# Patient Record
Sex: FEMALE | Race: WHITE | Employment: FULL TIME | URBAN - NONMETROPOLITAN AREA
[De-identification: names, ages, dates, MRNs, and addresses within clinical notes are randomized per-mention and may not be internally consistent; named-entity substitution may affect disease eponyms.]

---

## 2017-12-29 ENCOUNTER — APPOINTMENT (OUTPATIENT)
Dept: CT IMAGING | Age: 22
End: 2017-12-29
Payer: COMMERCIAL

## 2017-12-29 ENCOUNTER — APPOINTMENT (OUTPATIENT)
Dept: GENERAL RADIOLOGY | Age: 22
End: 2017-12-29
Payer: COMMERCIAL

## 2017-12-29 ENCOUNTER — HOSPITAL ENCOUNTER (EMERGENCY)
Age: 22
Discharge: HOME | End: 2017-12-29
Attending: FAMILY MEDICINE
Payer: COMMERCIAL

## 2017-12-29 VITALS
TEMPERATURE: 98.1 F | HEART RATE: 73 BPM | WEIGHT: 230 LBS | RESPIRATION RATE: 14 BRPM | BODY MASS INDEX: 32.93 KG/M2 | DIASTOLIC BLOOD PRESSURE: 79 MMHG | HEIGHT: 70 IN | SYSTOLIC BLOOD PRESSURE: 109 MMHG | OXYGEN SATURATION: 98 %

## 2017-12-29 DIAGNOSIS — S80.01XA CONTUSION OF RIGHT KNEE, INITIAL ENCOUNTER: ICD-10-CM

## 2017-12-29 DIAGNOSIS — S16.1XXA CERVICAL STRAIN, ACUTE, INITIAL ENCOUNTER: Primary | ICD-10-CM

## 2017-12-29 PROCEDURE — 99283 EMERGENCY DEPT VISIT LOW MDM: CPT

## 2017-12-29 PROCEDURE — 72125 CT NECK SPINE W/O DYE: CPT

## 2017-12-29 PROCEDURE — 70450 CT HEAD/BRAIN W/O DYE: CPT

## 2017-12-29 PROCEDURE — 73564 X-RAY EXAM KNEE 4 OR MORE: CPT

## 2017-12-29 RX ORDER — METAXALONE 800 MG/1
800 TABLET ORAL 3 TIMES DAILY PRN
Qty: 30 TABLET | Refills: 0 | Status: SHIPPED | OUTPATIENT
Start: 2017-12-29 | End: 2018-01-08

## 2017-12-29 RX ORDER — NAPROXEN 500 MG/1
500 TABLET ORAL 2 TIMES DAILY PRN
Qty: 30 TABLET | Refills: 0 | Status: SHIPPED | OUTPATIENT
Start: 2017-12-29 | End: 2022-06-29

## 2017-12-29 ASSESSMENT — PAIN DESCRIPTION - FREQUENCY: FREQUENCY: CONTINUOUS

## 2017-12-29 ASSESSMENT — PAIN SCALES - GENERAL: PAINLEVEL_OUTOF10: 5

## 2017-12-29 ASSESSMENT — PAIN DESCRIPTION - ORIENTATION: ORIENTATION: RIGHT

## 2017-12-29 ASSESSMENT — PAIN DESCRIPTION - PAIN TYPE: TYPE: ACUTE PAIN

## 2017-12-29 ASSESSMENT — PAIN DESCRIPTION - DESCRIPTORS: DESCRIPTORS: ACHING

## 2017-12-29 ASSESSMENT — PAIN DESCRIPTION - LOCATION: LOCATION: KNEE
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
## 2017-12-29 NOTE — ED NOTES
Patient decides on Xrays. To Xray and back per wheel chair.      Jose Cruz Stevens, SHAYNAN  40/78/76 1244

## 2017-12-29 NOTE — ED NOTES
Dr. Wagner Govern to speak with patient regarding Xray results.      Mag Treadwell LPN  46/90/99 5401

## 2017-12-29 NOTE — ED NOTES
Patient undecided on whether she wants Xrays or not waiting to talk to Father.      Jermaine Manzanares, SHAYNAN  46/25/86 4800
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
## 2018-03-05 ENCOUNTER — HOSPITAL ENCOUNTER (EMERGENCY)
Age: 23
Discharge: HOME | End: 2018-03-05
Payer: COMMERCIAL

## 2018-03-05 VITALS
BODY MASS INDEX: 34.96 KG/M2 | WEIGHT: 236 LBS | HEART RATE: 89 BPM | HEIGHT: 69 IN | SYSTOLIC BLOOD PRESSURE: 126 MMHG | RESPIRATION RATE: 16 BRPM | OXYGEN SATURATION: 98 % | DIASTOLIC BLOOD PRESSURE: 80 MMHG | TEMPERATURE: 97.2 F

## 2018-03-05 DIAGNOSIS — J04.0 LARYNGITIS, ACUTE: ICD-10-CM

## 2018-03-05 DIAGNOSIS — J02.9 VIRAL PHARYNGITIS: Primary | ICD-10-CM

## 2018-03-05 PROCEDURE — 99212 OFFICE O/P EST SF 10 MIN: CPT

## 2018-03-05 PROCEDURE — 99202 OFFICE O/P NEW SF 15 MIN: CPT | Performed by: NURSE PRACTITIONER

## 2018-03-05 RX ORDER — METHYLPREDNISOLONE 4 MG/1
TABLET ORAL
Qty: 1 KIT | Refills: 0 | Status: SHIPPED | OUTPATIENT
Start: 2018-03-05 | End: 2018-03-11

## 2018-03-05 ASSESSMENT — ENCOUNTER SYMPTOMS
VOICE CHANGE: 1
SINUS CONGESTION: 0
SINUS PRESSURE: 0
ABDOMINAL PAIN: 0
RHINORRHEA: 0
DIARRHEA: 0
VOMITING: 0
SORE THROAT: 1
NAUSEA: 0
SHORTNESS OF BREATH: 0
TROUBLE SWALLOWING: 0
SINUS PAIN: 0
BACK PAIN: 0
CHEST TIGHTNESS: 0
COUGH: 1

## 2018-03-06 NOTE — ED PROVIDER NOTES
[facility]  Urgent Care Encounter       CHIEF COMPLAINT       Chief Complaint   Patient presents with    Cough    Pharyngitis       Nurses Notes reviewed and I agree except as noted in the HPI. HISTORY OF PRESENT ILLNESS   Josef Lopez is a 23 y.o. female who presents To the urgent care center complaining of a sore throat and cough. 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 The patient does have a raspy voice. The patient denied any fever or chills. The patient states she has been singing about 4 hours a day and may have some laryngitis from overusage of the voice. The history is provided by the patient. No  was used. Pharyngitis   Location:  Generalized  Quality:  Sore  Severity:  Mild  Onset quality:  Sudden  Duration:  2 days  Timing:  Constant  Progression:  Unchanged  Chronicity:  New  Relieved by:  Nothing  Worsened by:  Nothing  Ineffective treatments:  None tried  Associated symptoms: cough and voice change    Associated symptoms: no abdominal pain, no adenopathy, no chest pain, no chills, no ear discharge, no ear pain, no epistaxis, no fever, no headaches, no neck stiffness, no postnasal drip, no rash, no rhinorrhea, no shortness of breath, no sinus congestion and no trouble swallowing    Cough:     Cough characteristics:  Non-productive, harsh, barking and dry    Severity:  Mild    Onset quality:  Sudden    Duration:  1 day    Timing:  Intermittent    Progression:  Waxing and waning    Chronicity:  New  Risk factors: sick contacts    Risk factors: no exposure to strep    Risk factors comment:  Flu A      REVIEW OF SYSTEMS     Review of Systems   Constitutional: Negative for activity change, appetite change, chills, diaphoresis, fatigue and fever. HENT: Positive for sore throat and voice change.  Negative for congestion, ear discharge, ear pain, appear ill. No distress. HENT:   Head: Normocephalic. Right Ear: Hearing, tympanic membrane, external ear and ear canal normal. No drainage, swelling or tenderness. No mastoid tenderness. Tympanic membrane is not erythematous. Left Ear: Hearing, tympanic membrane, external ear and ear canal normal. No drainage, swelling or tenderness. No mastoid tenderness. Tympanic membrane is not erythematous. Nose: Nose normal. No mucosal edema or rhinorrhea. Right sinus exhibits no maxillary sinus tenderness and no frontal sinus tenderness. Left sinus exhibits no maxillary sinus tenderness and no frontal sinus tenderness. Mouth/Throat: Uvula is midline, oropharynx is clear and moist and mucous membranes are normal. No oropharyngeal exudate, posterior oropharyngeal edema or posterior oropharyngeal erythema. Neck: Normal range of motion and full passive range of motion without pain. Neck supple. No muscular tenderness present. Cardiovascular: Normal rate, regular rhythm, S1 normal, S2 normal and normal heart sounds. Pulmonary/Chest: Effort normal and breath sounds normal. No accessory muscle usage. No respiratory distress. She has no decreased breath sounds. She has no wheezes. She has no rhonchi. She has no rales. She exhibits no tenderness. Abdominal: Normal appearance. Lymphadenopathy:        Head (right side): No submental, no submandibular, no tonsillar, no preauricular, no posterior auricular and no occipital adenopathy present. Head (left side): No submental, no submandibular, no tonsillar, no preauricular, no posterior auricular and no occipital adenopathy present. She has no cervical adenopathy. Right: No supraclavicular adenopathy present. Left: No supraclavicular adenopathy present. Neurological: She is alert and oriented to person, place, and time. Skin: Skin is warm and dry. She is not diaphoretic. Nursing note and vitals reviewed.       DIAGNOSTIC RESULTS   Labs:No

## 2018-03-06 NOTE — ED TRIAGE NOTES
Patient ambulated to room with complaint of cough and sore throat that started 2 days ago. Patient states brother was positive for influenza A on Friday.

## 2020-01-08 PROBLEM — M54.2 NECK PAIN: Status: ACTIVE | Noted: 2020-01-08

## 2020-01-08 PROBLEM — G89.29 CHRONIC LOW BACK PAIN WITHOUT SCIATICA: Status: ACTIVE | Noted: 2020-01-08

## 2020-01-08 PROBLEM — R51.9 FREQUENT HEADACHES: Status: ACTIVE | Noted: 2020-01-08

## 2020-01-08 PROBLEM — M25.512 CHRONIC PAIN OF BOTH SHOULDERS: Status: ACTIVE | Noted: 2020-01-08

## 2020-01-08 PROBLEM — R20.0 NUMBNESS AND TINGLING IN BOTH HANDS: Status: ACTIVE | Noted: 2020-01-08

## 2020-01-08 PROBLEM — M54.9 UPPER BACK PAIN: Status: ACTIVE | Noted: 2020-01-08

## 2020-01-08 PROBLEM — N62 MACROMASTIA: Status: ACTIVE | Noted: 2020-01-08

## 2020-01-08 PROBLEM — G89.29 CHRONIC PAIN OF BOTH SHOULDERS: Status: ACTIVE | Noted: 2020-01-08

## 2020-01-08 PROBLEM — M25.511 CHRONIC PAIN OF BOTH SHOULDERS: Status: ACTIVE | Noted: 2020-01-08

## 2020-01-08 PROBLEM — M54.50 CHRONIC LOW BACK PAIN WITHOUT SCIATICA: Status: ACTIVE | Noted: 2020-01-08

## 2020-01-08 PROBLEM — L30.4 INTERTRIGO: Status: ACTIVE | Noted: 2020-01-08

## 2020-01-08 PROBLEM — M40.00 KYPHOSIS (ACQUIRED) (POSTURAL): Status: ACTIVE | Noted: 2020-01-08

## 2020-01-08 PROBLEM — R20.2 NUMBNESS AND TINGLING IN BOTH HANDS: Status: ACTIVE | Noted: 2020-01-08

## 2020-08-16 ENCOUNTER — HOSPITAL ENCOUNTER (EMERGENCY)
Age: 25
Discharge: HOME | End: 2020-08-16

## 2020-08-16 VITALS
BODY MASS INDEX: 32.93 KG/M2 | TEMPERATURE: 97.6 F | WEIGHT: 230 LBS | HEIGHT: 70 IN | SYSTOLIC BLOOD PRESSURE: 110 MMHG | DIASTOLIC BLOOD PRESSURE: 65 MMHG | RESPIRATION RATE: 18 BRPM | HEART RATE: 86 BPM | OXYGEN SATURATION: 99 %

## 2020-08-16 PROCEDURE — 99212 OFFICE O/P EST SF 10 MIN: CPT

## 2020-08-16 PROCEDURE — 99213 OFFICE O/P EST LOW 20 MIN: CPT | Performed by: NURSE PRACTITIONER

## 2020-08-16 RX ORDER — PREDNISONE 10 MG/1
TABLET ORAL
Qty: 30 TABLET | Refills: 0 | Status: SHIPPED | OUTPATIENT
Start: 2020-08-16 | End: 2022-06-29

## 2020-08-16 RX ORDER — IVERMECTIN 3 MG/1
TABLET ORAL
Qty: 14 TABLET | Refills: 0 | Status: SHIPPED | OUTPATIENT
Start: 2020-08-16 | End: 2022-06-29

## 2020-08-16 ASSESSMENT — PAIN DESCRIPTION - ORIENTATION: ORIENTATION: RIGHT;LEFT

## 2020-08-16 ASSESSMENT — ENCOUNTER SYMPTOMS
NAUSEA: 0
RHINORRHEA: 0
COUGH: 0
TROUBLE SWALLOWING: 0
SHORTNESS OF BREATH: 0
SORE THROAT: 0
EYE REDNESS: 0
EYE DISCHARGE: 0
DIARRHEA: 0
VOMITING: 0

## 2020-08-16 ASSESSMENT — PAIN DESCRIPTION - DESCRIPTORS: DESCRIPTORS: ITCHING

## 2020-08-16 ASSESSMENT — PAIN DESCRIPTION - LOCATION: LOCATION: BUTTOCKS;LEG

## 2020-08-16 ASSESSMENT — PAIN DESCRIPTION - FREQUENCY: FREQUENCY: CONTINUOUS

## 2020-08-16 ASSESSMENT — PAIN DESCRIPTION - PAIN TYPE: TYPE: ACUTE PAIN

## 2020-08-16 NOTE — ED PROVIDER NOTES
[id number]       Chief Complaint   Patient presents with    Rash       Nurses Notes reviewed and I agree except as noted in the HPI. HISTORY OF PRESENT ILLNESS   Aneesh Mendez is a 30 y.o. female who presents with complaints of rash. Onset between 7 and 10 days ago, worsening. Rash present to the bilateral upper/lower extremities. Associated pruritus, denies pain. Unaware of new exposure. Symptom onset shortly after vacation to [facility]. Patient resided on a hospital while on vacation. No exposure similar rash. No treatment prior to arrival.    REVIEW OF SYSTEMS     Review of Systems   Constitutional: Negative for chills, diaphoresis, fatigue and fever. HENT: Negative for congestion, ear pain, rhinorrhea, sore throat and trouble swallowing. Eyes: Negative for discharge and redness. Respiratory: Negative for cough and shortness of breath. Cardiovascular: Negative for chest pain. Gastrointestinal: Negative for diarrhea, nausea and vomiting. Genitourinary: Negative for decreased urine volume. Musculoskeletal: Negative for neck pain and neck stiffness. Skin: Positive for rash. Neurological: Negative for headaches. Hematological: Negative for adenopathy. Psychiatric/Behavioral: Negative for sleep disturbance. PAST MEDICAL HISTORY   History reviewed. No pertinent past medical history. SURGICAL HISTORY     Patient  has no past surgical history on file. CURRENT MEDICATIONS       Discharge Medication List as of 8/16/2020 11:18 AM      CONTINUE these medications which have NOT CHANGED    Details   naproxen (NAPROSYN) 500 MG tablet Take 1 tablet by mouth 2 times daily as needed for Pain, Disp-30 tablet, R-0Print             ALLERGIES     Patient is has No Known Allergies. FAMILY HISTORY     Patient'sfamily history is not on file. SOCIAL HISTORY     Patient  reports that she has never smoked.  She has never used smokeless tobacco. She reports current alcohol use. She reports that she does not use drugs. PHYSICAL EXAM     ED TRIAGE VITALS  BP: 110/65, Temp: 97.6 °F (36.4 °C), Pulse: 86, Resp: 18, SpO2: 99 %  Physical Exam  Vitals signs and nursing note reviewed. Constitutional:       General: She is not in acute distress. Appearance: Normal appearance. She is well-developed. She is not ill-appearing, toxic-appearing or diaphoretic. HENT:      Head: Normocephalic and atraumatic. Eyes:      General: No scleral icterus. Conjunctiva/sclera: Conjunctivae normal.   Cardiovascular:      Pulses:           Posterior tibial pulses are 2+ on the right side and 2+ on the left side. Pulmonary:      Effort: No respiratory distress. Musculoskeletal:      Right lower leg: She exhibits no swelling. No edema. Left lower leg: She exhibits no swelling. No edema. Skin:     General: Skin is warm and dry. Capillary Refill: Capillary refill takes less than 2 seconds. Findings: Rash present. Rash is macular and papular. Rash is not crusting, scaling or vesicular. Comments: Diffuse maculopapular rash to the bilateral upper/lower extremities. Significant involvement of flexural surface. No vesicles. No secondary infection. Neurological:      Mental Status: She is alert and oriented to person, place, and time. Psychiatric:         Mood and Affect: Mood normal.         Behavior: Behavior is cooperative. DIAGNOSTIC RESULTS   Labs: No results found for this visit on 08/16/20. IMAGING:  No orders to display     URGENT CARE COURSE:     Vitals:    08/16/20 1055   BP: 110/65   Pulse: 86   Resp: 18   Temp: 97.6 °F (36.4 °C)   TempSrc: Temporal   SpO2: 99%   Weight: 230 lb (104.3 kg)   Height: 5' 10\" (1.778 m)       Medications - No data to display  PROCEDURES:  None  FINALIMPRESSION      1.  Contact dermatitis, unspecified contact dermatitis type, unspecified trigger DISPOSITION/PLAN   DISPOSITION Decision To Discharge 08/16/2020 11:14:16 AM  Nontoxic, no acute distress. Rash consistent with contact dermatitis. Differential diagnosis include dermatitis secondary to poison plant sources versus scabies. No secondary infection. Medication as prescribed as patient prefer to be treated for both conditions. Benadryl as needed. Follow-up with PCP if no better in 2 weeks. PATIENT REFERRED TO:  SOPHIA Irving CNP  [address]  [phone]    In 2 weeks  Follow up if no better in 2 weeks. Medications as prescribed.     DISCHARGE MEDICATIONS:  Discharge Medication List as of 8/16/2020 11:18 AM      START taking these medications    Details   ivermectin 3 MG tablet Take 7 tablets by mouth day 1, then take 7 tablets by mouth in 1 week., Disp-14 tablet,R-0Print      predniSONE (DELTASONE) 10 MG tablet Take 4 tablets by mouth days 1-3, 3 tablets days 4-6, 2 tablets days 7-9, 1 tablet days 10-12., Disp-30 tablet,R-0Print           Discharge Medication List as of 8/16/2020 11:18 AM          SOPHIA Correa CNP, APRN - CNP  08/16/20 [id number]

## 2020-08-16 NOTE — ED TRIAGE NOTES
Patient states she noticed a rash on the back of her left thigh and buttocks area on or around August 8th. Patient states since then it has spread to both legs and arms and is very itchy.

## 2022-06-29 ENCOUNTER — OFFICE VISIT (OUTPATIENT)
Dept: FAMILY MEDICINE CLINIC | Age: 27
End: 2022-06-29
Payer: COMMERCIAL

## 2022-06-29 ENCOUNTER — TELEPHONE (OUTPATIENT)
Dept: FAMILY MEDICINE CLINIC | Age: 27
End: 2022-06-29

## 2022-06-29 VITALS
BODY MASS INDEX: 31.5 KG/M2 | WEIGHT: 220 LBS | OXYGEN SATURATION: 97 % | DIASTOLIC BLOOD PRESSURE: 78 MMHG | HEART RATE: 74 BPM | HEIGHT: 70 IN | SYSTOLIC BLOOD PRESSURE: 106 MMHG | RESPIRATION RATE: 16 BRPM | TEMPERATURE: 97 F

## 2022-06-29 DIAGNOSIS — R06.02 SHORTNESS OF BREATH: Primary | ICD-10-CM

## 2022-06-29 DIAGNOSIS — B96.89 ACUTE BACTERIAL SINUSITIS: ICD-10-CM

## 2022-06-29 DIAGNOSIS — H10.32 ACUTE CONJUNCTIVITIS OF LEFT EYE, UNSPECIFIED ACUTE CONJUNCTIVITIS TYPE: ICD-10-CM

## 2022-06-29 DIAGNOSIS — J01.90 ACUTE BACTERIAL SINUSITIS: ICD-10-CM

## 2022-06-29 LAB
Lab: NORMAL
QC PASS/FAIL: NORMAL
SARS-COV-2 RDRP RESP QL NAA+PROBE: NEGATIVE

## 2022-06-29 PROCEDURE — 99203 OFFICE O/P NEW LOW 30 MIN: CPT | Performed by: NURSE PRACTITIONER

## 2022-06-29 PROCEDURE — 87635 SARS-COV-2 COVID-19 AMP PRB: CPT | Performed by: NURSE PRACTITIONER

## 2022-06-29 RX ORDER — POLYMYXIN B SULFATE AND TRIMETHOPRIM 1; 10000 MG/ML; [USP'U]/ML
1 SOLUTION OPHTHALMIC EVERY 4 HOURS
Qty: 10 ML | Refills: 0 | Status: SHIPPED | OUTPATIENT
Start: 2022-06-29 | End: 2022-07-06

## 2022-06-29 RX ORDER — AMOXICILLIN AND CLAVULANATE POTASSIUM 875; 125 MG/1; MG/1
1 TABLET, FILM COATED ORAL 2 TIMES DAILY
Qty: 14 TABLET | Refills: 0 | Status: SHIPPED | OUTPATIENT
Start: 2022-06-29 | End: 2022-07-06 | Stop reason: ALTCHOICE

## 2022-06-29 RX ORDER — PREDNISONE 20 MG/1
40 TABLET ORAL DAILY
Qty: 10 TABLET | Refills: 0 | Status: SHIPPED | OUTPATIENT
Start: 2022-06-29 | End: 2022-07-04

## 2022-06-29 ASSESSMENT — PATIENT HEALTH QUESTIONNAIRE - PHQ9
1. LITTLE INTEREST OR PLEASURE IN DOING THINGS: 1
SUM OF ALL RESPONSES TO PHQ QUESTIONS 1-9: 2
2. FEELING DOWN, DEPRESSED OR HOPELESS: 1
SUM OF ALL RESPONSES TO PHQ9 QUESTIONS 1 & 2: 2

## 2022-06-29 NOTE — PATIENT INSTRUCTIONS
Patient Education        Pinkeye: Care Instructions  Overview     Pinkeye is redness and swelling of the eye surface and the conjunctiva (the lining of the eyelid and the covering of the white part of the eye). Pinkeye is also called conjunctivitis. Pinkeye is often caused by infection with bacteriaor a virus. Dry air, allergies, smoke, and chemicals are other common causes. Pinkeye often gets better on its own in 7 to 10 days. Antibiotics only help if the pinkeye is caused by bacteria. Pinkeye caused by infection spreads easily. If an allergy or chemical is causing pinkeye, it will not go away unless youcan avoid whatever is causing it. Follow-up care is a key part of your treatment and safety. Be sure to make and go to all appointments, and call your doctor if you are having problems. It's also a good idea to know your test results and keep alist of the medicines you take. How can you care for yourself at home?  Wash your hands often. Always wash them before and after you treat pinkeye or touch your eyes or face.  Use moist cotton or a clean, wet cloth to remove crust. Wipe from the inside corner of the eye to the outside. Use a clean part of the cloth for each wipe.  Put cold or warm wet cloths on your eye a few times a day if the eye hurts.  Do not wear contact lenses or eye makeup until the pinkeye is gone. Throw away any eye makeup you were using when you got pinkeye. Clean your contacts and storage case. If you wear disposable contacts, use a new pair when your eye has cleared and it is safe to wear contacts again.  If the doctor gave you antibiotic ointment or eyedrops, use them as directed. Use the medicine for as long as instructed, even if your eye starts looking better soon. Keep the bottle tip clean, and do not let it touch the eye area.  To put in eyedrops or ointment:  ? Tilt your head back, and pull your lower eyelid down with one finger. ?  Drop or squirt the medicine inside the lower lid. ? Close your eye for 30 to 60 seconds to let the drops or ointment move around. ? Do not touch the ointment or dropper tip to your eyelashes or any other surface.  Do not share towels, pillows, or washcloths while you have pinkeye. When should you call for help? Call your doctor now or seek immediate medical care if:     You have pain in your eye, not just irritation on the surface.      You have a change in vision or loss of vision.      You have an increase in discharge from the eye.      Your eye has not started to improve or begins to get worse within 48 hours after you start using antibiotics.      Pinkeye lasts longer than 7 days. Watch closely for changes in your health, and be sure to contact your doctor ifyou have any problems. Where can you learn more? Go to [email or web address] org and sign in to your GuestDriven account. Enter Y392 in the [place] box to learn more about \"Pinkeye: Care Instructions. \"     If you do not have an account, please click on the \"Sign Up Now\" link. Current as of: March 9, 2022               Content Version: 13.3  © 8486-0234 [employer]. Care instructions adapted under license by [facility] ([facility] [place]). If you have questions about a medical condition or this instruction, always ask your healthcare professional. [place] [address] any warranty or liability for your use of this information. Patient Education        Sinusitis: Care Instructions  Your Care Instructions     Sinusitis is an infection of the lining of the sinus cavities in your head. Sinusitis often follows a cold. It causes pain and pressure in your head andface. In most cases, sinusitis gets better on its own in 1 to 2 weeks. But some mildsymptoms may last for several weeks. Sometimes antibiotics are needed. Follow-up care is a key part of your treatment and safety.  Be sure to make and go to all appointments, and call your doctor if you are having problems. It's also a good idea to know your test results and keep alist of the medicines you take. How can you care for yourself at home?  Take an over-the-counter pain medicine, such as acetaminophen (Tylenol), ibuprofen (Advil, Motrin), or naproxen (Aleve). Read and follow all instructions on the label.  If the doctor prescribed antibiotics, take them as directed. Do not stop taking them just because you feel better. You need to take the full course of antibiotics.  Be careful when taking over-the-counter cold or flu medicines and Tylenol at the same time. Many of these medicines have acetaminophen, which is Tylenol. Read the labels to make sure that you are not taking more than the recommended dose. Too much acetaminophen (Tylenol) can be harmful.  Breathe warm, moist air from a steamy shower, a hot bath, or a sink filled with hot water. Avoid cold, dry air. Using a humidifier in your home may help. Follow the directions for cleaning the machine.  Use saline (saltwater) nasal washes. This can help keep your nasal passages open and wash out mucus and bacteria. You can buy saline nose drops at a grocery store or drugstore. Or you can make your own at home by adding 1 teaspoon of salt and 1 teaspoon of baking soda to 2 cups of distilled water. If you make your own, fill a bulb syringe with the solution, insert the tip into your nostril, and squeeze gently. Syed Griffon your nose.  Put a hot, wet towel or a warm gel pack on your face 3 or 4 times a day for 5 to 10 minutes each time.  Try a decongestant nasal spray like oxymetazoline (Afrin). Do not use it for more than 3 days in a row. Using it for more than 3 days can make your congestion worse. When should you call for help? Call your doctor now or seek immediate medical care if:     You have new or worse swelling or redness in your face or around your eyes.      You have a new or higher fever.    Watch closely for changes in your health, and be sure to contact your doctor if:     You have new or worse facial pain.      The mucus from your nose becomes thicker (like pus) or has new blood in it.      You are not getting better as expected. Where can you learn more? Go to [email or web address] org and sign in to your [employer] account. Enter T652 in the Blackstrap box to learn more about \"Sinusitis: Care Instructions. \"     If you do not have an account, please click on the \"Sign Up Now\" link. Current as of: September 8, 2021               Content Version: 13.3  © 5416-9012 Healthwise, Incorporated. Care instructions adapted under license by [facility] ([facility]). If you have questions about a medical condition or this instruction, always ask your healthcare professional. [address] any warranty or liability for your use of this information.

## 2022-06-29 NOTE — TELEPHONE ENCOUNTER
Tried calling patient to see if she can come into the office to be seen. She can come in anytime before 3:30. Left message on answering machine requesting pt to call back at earliest convenience.

## 2022-06-29 NOTE — PROGRESS NOTES
SUBJECTIVE:  Peter Banda is a 32 y.o. y/o female that presents with Nasal Congestion, Cough, Facial Swelling (left), and Ear Fullness  . HPI:      Symptoms have been present for 1 week(s). Symptoms are unchanged since they initially started. Fever? No  Runny nose or congestion? Yes  Cough? Yes, productive  Sore throat? Yes  Shortness of breath/Wheezing? Yes  Other associated symptoms?  body aches, ear pain, headaches and nausea      Known COVID exposures or RFs? No  Smoker? Yes  Preexisting Respiratory conditions?  none    Tried Nyquil, dayquil, and sinus medication and Benadryl. left eye Complaint      Symptoms have been present for 3 day(s)  Inciting Event or Trauma - No    Redness - Yes  Burning - Yes  Matting or Drainage - Yes  Changes in vision - Yes - blurry  Associated symptoms - blurred vision, discharge, itching, pain and photophobia    Does patient wear contacts - No, If yes, any inappropriate use? (Overnight, longer than prescribed, etc.) - No    No past medical history on file. Tobacco Use      Smoking status: Never Smoker      Smokeless tobacco: Never Used      OBJECTIVE:  /78   Pulse 74   Temp 97 °F (36.1 °C) (Infrared)   Resp 16   Ht 5' 10\" (1.778 m)   Wt 220 lb (99.8 kg)   SpO2 97%   BMI 31.57 kg/m²   General appearance: alert, well appearing, and in no distress and oriented to person, place, and time. ENT exam reveals - right TM fluid noted, neck without nodes and pharynx erythematous without exudate. CVS exam: normal rate, regular rhythm, normal S1, S2, no murmurs, rubs, clicks or gallops. Chest:clear to auscultation, no wheezes, rales or rhonchi, symmetric air entry, no tachypnea, retractions or cyanosis. Abdominal exam: soft, nontender, nondistended, no masses or organomegaly. Extremities:  No clubbing, cyanosis or edema  Skin exam - normal coloration and turgor, no rashes, no suspicious skin lesions noted. Psych -  Affect appropriate.   Thought process is normal without evidence of depression or psychosis. Good insight and appropriae interaction. Cognition and memory appear to be intact. ASSESSMENT & PLAN  Sylvia Sifuentes was seen today for nasal congestion, cough, facial swelling and ear fullness. Diagnoses and all orders for this visit:    Shortness of breath  -     POCT COVID-19 Rapid, NAAT    Acute bacterial sinusitis  -     amoxicillin-clavulanate (AUGMENTIN) 875-125 MG per tablet; Take 1 tablet by mouth 2 times daily for 7 days  -     predniSONE (DELTASONE) 20 MG tablet; Take 2 tablets by mouth daily for 5 days    Acute conjunctivitis of left eye, unspecified acute conjunctivitis type  -     trimethoprim-polymyxin b (POLYTRIM) 32624-6.1 UNIT/ML-% ophthalmic solution; Place 1 drop into the left eye every 4 hours for 7 days        No follow-ups on file.     -Start above treatments  -Patient advised to contact our office immediately if symptoms worsen or persist  -Patient counseled on conservative care including fluids, rest and OTC meds      I have reviewed this patient's history, habits, and medication list and have updated the chart where appropriate.

## 2022-07-06 ENCOUNTER — OFFICE VISIT (OUTPATIENT)
Dept: FAMILY MEDICINE CLINIC | Age: 27
End: 2022-07-06
Payer: COMMERCIAL

## 2022-07-06 ENCOUNTER — NURSE ONLY (OUTPATIENT)
Dept: LAB | Age: 27
End: 2022-07-06

## 2022-07-06 VITALS
HEIGHT: 70 IN | WEIGHT: 221.6 LBS | TEMPERATURE: 97 F | SYSTOLIC BLOOD PRESSURE: 110 MMHG | RESPIRATION RATE: 16 BRPM | DIASTOLIC BLOOD PRESSURE: 70 MMHG | BODY MASS INDEX: 31.73 KG/M2 | HEART RATE: 66 BPM | OXYGEN SATURATION: 100 %

## 2022-07-06 DIAGNOSIS — F32.81 PMDD (PREMENSTRUAL DYSPHORIC DISORDER): ICD-10-CM

## 2022-07-06 DIAGNOSIS — R55 SYNCOPE AND COLLAPSE: Primary | ICD-10-CM

## 2022-07-06 DIAGNOSIS — R55 SYNCOPE AND COLLAPSE: ICD-10-CM

## 2022-07-06 LAB
ALBUMIN SERPL-MCNC: 4.2 G/DL (ref 3.5–5.1)
ALP BLD-CCNC: 48 U/L (ref 38–126)
ALT SERPL-CCNC: 13 U/L (ref 11–66)
ANION GAP SERPL CALCULATED.3IONS-SCNC: 8 MEQ/L (ref 8–16)
AST SERPL-CCNC: 11 U/L (ref 5–40)
BILIRUB SERPL-MCNC: 0.4 MG/DL (ref 0.3–1.2)
BUN BLDV-MCNC: 8 MG/DL (ref 7–22)
CALCIUM SERPL-MCNC: 9.2 MG/DL (ref 8.5–10.5)
CHLORIDE BLD-SCNC: 101 MEQ/L (ref 98–111)
CO2: 29 MEQ/L (ref 23–33)
CREAT SERPL-MCNC: 0.6 MG/DL (ref 0.4–1.2)
ERYTHROCYTE [DISTWIDTH] IN BLOOD BY AUTOMATED COUNT: 15.7 % (ref 11.5–14.5)
ERYTHROCYTE [DISTWIDTH] IN BLOOD BY AUTOMATED COUNT: 48.3 FL (ref 35–45)
GFR SERPL CREATININE-BSD FRML MDRD: > 90 ML/MIN/1.73M2
GLUCOSE BLD-MCNC: 93 MG/DL (ref 70–108)
HCT VFR BLD CALC: 42.2 % (ref 37–47)
HEMOGLOBIN: 13.1 GM/DL (ref 12–16)
MCH RBC QN AUTO: 26.4 PG (ref 26–33)
MCHC RBC AUTO-ENTMCNC: 31 GM/DL (ref 32.2–35.5)
MCV RBC AUTO: 84.9 FL (ref 81–99)
PLATELET # BLD: 329 THOU/MM3 (ref 130–400)
PMV BLD AUTO: 9.8 FL (ref 9.4–12.4)
POTASSIUM SERPL-SCNC: 4.4 MEQ/L (ref 3.5–5.2)
RBC # BLD: 4.97 MILL/MM3 (ref 4.2–5.4)
SODIUM BLD-SCNC: 138 MEQ/L (ref 135–145)
T4 FREE: 1.27 NG/DL (ref 0.93–1.76)
TOTAL PROTEIN: 6.4 G/DL (ref 6.1–8)
TSH SERPL DL<=0.05 MIU/L-ACNC: 3.6 UIU/ML (ref 0.4–4.2)
WBC # BLD: 9.2 THOU/MM3 (ref 4.8–10.8)

## 2022-07-06 PROCEDURE — 99204 OFFICE O/P NEW MOD 45 MIN: CPT | Performed by: STUDENT IN AN ORGANIZED HEALTH CARE EDUCATION/TRAINING PROGRAM

## 2022-07-06 PROCEDURE — 93000 ELECTROCARDIOGRAM COMPLETE: CPT | Performed by: STUDENT IN AN ORGANIZED HEALTH CARE EDUCATION/TRAINING PROGRAM

## 2022-07-06 RX ORDER — CITALOPRAM 20 MG/1
20 TABLET ORAL DAILY
Qty: 30 TABLET | Refills: 3 | Status: SHIPPED | OUTPATIENT
Start: 2022-07-06 | End: 2022-10-04 | Stop reason: SDUPTHER

## 2022-07-06 SDOH — ECONOMIC STABILITY: FOOD INSECURITY: WITHIN THE PAST 12 MONTHS, THE FOOD YOU BOUGHT JUST DIDN'T LAST AND YOU DIDN'T HAVE MONEY TO GET MORE.: NEVER TRUE

## 2022-07-06 SDOH — ECONOMIC STABILITY: FOOD INSECURITY: WITHIN THE PAST 12 MONTHS, YOU WORRIED THAT YOUR FOOD WOULD RUN OUT BEFORE YOU GOT MONEY TO BUY MORE.: NEVER TRUE

## 2022-07-06 ASSESSMENT — ENCOUNTER SYMPTOMS
SORE THROAT: 0
NAUSEA: 0
SHORTNESS OF BREATH: 0
COUGH: 0
ABDOMINAL PAIN: 0

## 2022-07-06 ASSESSMENT — SOCIAL DETERMINANTS OF HEALTH (SDOH): HOW HARD IS IT FOR YOU TO PAY FOR THE VERY BASICS LIKE FOOD, HOUSING, MEDICAL CARE, AND HEATING?: NOT HARD AT ALL

## 2022-07-06 NOTE — PROGRESS NOTES
[address] [facility] [address] [place] [place] [address]  Dept: [phone]  Dept Fax: [phone]  Loc: [phone]  PROGRESS NOTE      Visit Date: 7/6/2022    Rosalio Nunez is a 32 y.o. female who presents today for:  Chief Complaint   Patient presents with    New Patient     Establish PCP and also concerned about PMS about 2 weeks before period       Impression/Plan:  1. Syncope and collapse  One-time occurrence in January 2022  Given family history of arrhythmias and MI at young age and her sister, will pursue further work-up  EKG completed in office today. Normal rate and rhythm. Normal intervals. No acute ST changes. Echo, tilt table, and Holter monitor ordered  CMP, CBC, TSH/T4 ordered  - ECHO 2D WO Color Doppler Complete; Future  - EKG 12 Lead  - Comprehensive Metabolic Panel; Future  - CBC; Future  - Tilt Table Test  - Holter Monitor 24 Hour; Future  - TSH; Future  - T4, Free; Future    2. PMDD (premenstrual dysphoric disorder)  Chronic, progressive  Will start Celexa at 20 mg daily  Can consider increasing dose at follow-up visit in 4 weeks  - Comprehensive Metabolic Panel; Future  - CBC; Future  - TSH; Future  - T4, Free; Future      Return in about 4 weeks (around 8/3/2022) for Depression and syncope. Subjective:  HPI    Here to establish care. PMS:  Begins having symptoms 2 weeks before menstruation. Has extreme mood swings. Noticing it worsening for the past 1.5 years, but acutely worsened 8-9 months. Cycles are 26-28 days. Denies significant cramping or PMS during period. Struggling with agitation, concentration, and sleep disturbance. Also having anorexia during these episodes. She is interested in therapy. She has tried celexa before. She denies any formal therapy in the past.      Past medical history:  None  Family history: Grandfather (paternal) - lung cancer with history of smoking.   Heart issues on mother's side of family, potentially hypertension related. Uncle had DVT which propagated and led to stroke. Sister has incompetent cervix. Sister has POTS, heart attack at age 25 (pericarditis). Surgical history: Tonsils, adenoids, wisdom. Alcohol use:  Socially. Tobacco use:  None. Other drug use:  Denies. Exercise:  30 minutes/day, 3-4 times per week. Walking. Diet:  1-2 eating out/week. Pap Smear:  Last pap smear February 2021, normal.    Syncope:  Single episode of passing out in January 2022. Thinks she lost consciousness for about 30 seconds. Does not think it was during a time when she wasn't eating. She was sitting at her table on her phone and awoke on the ground. Denies palpitations or lightheadedness. She was drying grapefruit at the time. No further questions of complaints. Review of Systems   Constitutional: Negative for chills and fever. HENT: Negative for congestion and sore throat. Eyes: Negative for visual disturbance. Respiratory: Negative for cough and shortness of breath. Cardiovascular: Negative for chest pain. Gastrointestinal: Negative for abdominal pain and nausea. Genitourinary: Negative for dysuria. Skin: Negative for rash. Neurological: Positive for syncope. Negative for dizziness, light-headedness and headaches. Psychiatric/Behavioral: Positive for agitation, decreased concentration and sleep disturbance. Negative for hallucinations and suicidal ideas. The patient is not nervous/anxious. Patient Active Problem List   Diagnosis    Macromastia    Neck pain    Upper back pain    Chronic low back pain without sciatica    Kyphosis (acquired) (postural)    Frequent headaches    Chronic pain of both shoulders    Numbness and tingling in both hands    Intertrigo     History reviewed. No pertinent past medical history.    Past Surgical History:   Procedure Laterality Date    TONSILLECTOMY AND ADENOIDECTOMY  2006    WISDOM TOOTH EXTRACTION  2009     Family History   Problem Relation Age of Onset    Heart Disease Mother      Social History     Tobacco Use    Smoking status: Current Some Day Smoker    Smokeless tobacco: Never Used   Substance Use Topics    Alcohol use: Yes     Comment: occasional      Current Outpatient Medications   Medication Sig Dispense Refill    citalopram (CELEXA) 20 MG tablet Take 1 tablet by mouth daily 30 tablet 3    trimethoprim-polymyxin b (POLYTRIM) 88098-0.1 UNIT/ML-% ophthalmic solution Place 1 drop into the left eye every 4 hours for 7 days 10 mL 0     No current facility-administered medications for this visit. No Known Allergies      There is no immunization history on file for this patient.   Health Maintenance   Topic Date Due    Varicella vaccine (1 of 2 - 2-dose childhood series) Never done    COVID-19 Vaccine (1) Never done    DTaP/Tdap/Td vaccine (1 - Tdap) Never done    Pap smear  Never done    Flu vaccine (1) 09/01/2022    Depression Monitoring  06/29/2023    Hepatitis A vaccine  Aged Out    Hepatitis B vaccine  Aged Out    Hib vaccine  Aged Out    Meningococcal (ACWY) vaccine  Aged Out    Pneumococcal 0-64 years Vaccine  Aged Out    Hepatitis C screen  Discontinued    HIV screen  Discontinued       LABS  No results found for: LABA1C  No results found for: EAG  No components found for: CHLPL  No results found for: TRIG  No results found for: HDL  No results found for: [address]    Chemistry        Component Value Date/Time     07/06/2022 1538    K 4.4 07/06/2022 1538     07/06/2022 1538    CO2 29 07/06/2022 1538    BUN 8 07/06/2022 1538    CREATININE 0.6 07/06/2022 1538        Component Value Date/Time    CALCIUM 9.2 07/06/2022 1538    ALKPHOS 48 07/06/2022 1538    AST 11 07/06/2022 1538    ALT 13 07/06/2022 1538    BILITOT 0.4 07/06/2022 1538          No results found for: LABMICR, [id number]  Lab Results   Component Value Date    TSH 3.600 07/06/2022     No results found for: PSA  Lab Results   Component Value Date    WBC 9.2 07/06/2022    HGB 13.1 07/06/2022    HCT 42.2 07/06/2022    MCV 84.9 07/06/2022     07/06/2022       Objective:  /70 (Site: Left Upper Arm, Position: Sitting, Cuff Size: Medium Adult)   Pulse 66   Temp 97 °F (36.1 °C) (Temporal)   Resp 16   Ht 5' 10\" (1.778 m)   Wt 221 lb 9.6 oz (100.5 kg)   SpO2 100%   BMI 31.80 kg/m²     Physical Exam  Constitutional:       General: She is not in acute distress. Appearance: Normal appearance. HENT:      Head: Normocephalic. Right Ear: External ear normal.      Left Ear: External ear normal.      Nose: Nose normal.      Mouth/Throat:      Mouth: Mucous membranes are moist.   Eyes:      General: No scleral icterus. Extraocular Movements: Extraocular movements intact. Cardiovascular:      Rate and Rhythm: Normal rate and regular rhythm. Pulses: Normal pulses. Heart sounds: Normal heart sounds. Pulmonary:      Effort: Pulmonary effort is normal.      Breath sounds: Normal breath sounds. Abdominal:      General: Abdomen is flat. There is no distension. Palpations: Abdomen is soft. Musculoskeletal:         General: Normal range of motion. Cervical back: Normal range of motion. Skin:     General: Skin is warm and dry. Neurological:      General: No focal deficit present. Mental Status: She is alert and oriented to person, place, and time. Motor: No weakness. Gait: Gait normal.   Psychiatric:         Mood and Affect: Mood normal.         Behavior: Behavior normal.         Thought Content: Thought content normal.         Judgment: Judgment normal.         They voiced understanding. All questions answered. They agreed with treatment plan. See patient instructions for any educational materials that may have been given. Discussed use, benefit, and side effects of prescribed medications. Reviewed health maintenance.     (Please note that portions of this note may have been completed with a voice recognition program.  Efforts were made to edit the dictation but occasionally words are mis-transcribed.)      Electronically signed by Dwight Kiran DO on 7/6/2022 at 7:49 PM

## 2022-07-06 NOTE — PROGRESS NOTES
S: 32 y.o. female with   Chief Complaint   Patient presents with    New Patient     Establish PCP and also concerned about PMS about 2 weeks before period       33 yo female with perimenstrual mood symptoms in the setting of prior MDD. She also had remote syncopal episode in January of 2022 which was discussed today. Reviewed hx and ROS in detail with resident prior to exam.  See notes for additional details. BP Readings from Last 3 Encounters:   07/06/22 110/70   06/29/22 106/78   08/16/20 110/65     Wt Readings from Last 3 Encounters:   07/06/22 221 lb 9.6 oz (100.5 kg)   06/29/22 220 lb (99.8 kg)   08/16/20 230 lb (104.3 kg)           O: VS:  height is 5' 10\" (1.778 m) and weight is 221 lb 9.6 oz (100.5 kg). Her temporal temperature is 97 °F (36.1 °C). Her blood pressure is 110/70 and her pulse is 66. Her respiration is 16 and oxygen saturation is 100%. AAO/NAD, appropriate affect for mood  CV:  RRR, no murmur  Resp: CTAB     Diagnosis Orders   1. Syncope and collapse  ECHO 2D WO Color Doppler Complete    EKG 12 Lead    Comprehensive Metabolic Panel    CBC    Tilt Table Test    Holter Monitor 24 Hour    TSH    T4, Free   2. PMDD (premenstrual dysphoric disorder)  Comprehensive Metabolic Panel    CBC    TSH    T4, Free       Plan  1. Syncopal episode- EKG today. Fam hx of unspecified arrhythmia. Plan ECHO, Holter and labs. No recent symptoms. 2. PMDD- with hx of MDD- agree with re-initiation of citalopram.  Follow up for mood check in 4 wks. Health Maintenance Due   Topic Date Due    Varicella vaccine (1 of 2 - 2-dose childhood series) Never done    COVID-19 Vaccine (1) Never done    DTaP/Tdap/Td vaccine (1 - Tdap) Never done    Pap smear  Never done         Attending Physician Statement  I have discussed the case, including pertinent history and exam findings with the resident. I also have seen the patient and performed key portions of the examination.  I agree with the documented assessment and plan as documented by the resident.   GC modifier added to this encounter      Spencer Tijerina MD 7/6/2022 5:12 PM

## 2022-07-06 NOTE — PROGRESS NOTES
Health Maintenance Due   Topic Date Due    Varicella vaccine (1 of 2 - 2-dose childhood series) Never done    COVID-19 Vaccine (1) Never done    DTaP/Tdap/Td vaccine (1 - Tdap) Never done    Pap smear  Never done

## 2022-07-06 NOTE — PATIENT INSTRUCTIONS
step of the quitting process. Available 24 hours a day 7 days a week. Provides up to date researched based tool, step-by-step guides, and motivational messages. Online behavior modification   www.lungusa.org/stop-smoking/how-to-quit   HelpLine: 1-800-LUNG-USA (031-1482)   Email questions to:  Trung@ConnectQuest. OpenSpan    Website offers resources to help tobacco users figure out their reasons for quitting and then take the big step of quitting for good. Hypnosis   Location: [address] [place] [address], [place] [place], [place]   Contact: Ludin Yates, PhD at [phone]   Hypnosis for tobacco cessation   Cost $225 for the initial session and $175 for each session afterwards. Most patients require 6-8 sessions. There is the option to submit through the patients insurance. Hypnosis and behavior modification   Location: [place] [address],  [address] [facility] [employer], [place]   Contact: Shirley Jackson, PhD at [phone]  [facility] and hypnosis for nicotine addition   Cost: For uninsured patients:  Please call above phone number  Cost for insured patients depends on patients insurance plan. Behavior modification   Location: [facility], [address] [facility]   Contact: Kat Torres include four one-on-one appointments between the patient and a respiratory therapist.  The four appointments span over three weeks. The respiratory therapist schedules one of the appointments to occur 48 hours after the patients quit date.  Cost $100 total for the four sessions.   Tobacco cessation products are not included in the cost and are not provided by [facility].

## 2022-07-08 ENCOUNTER — TELEPHONE (OUTPATIENT)
Dept: FAMILY MEDICINE CLINIC | Age: 27
End: 2022-07-08

## 2022-07-08 NOTE — TELEPHONE ENCOUNTER
----- Message from Brigid Jackson DO sent at 7/7/2022 11:04 AM EDT -----  Labs overall normal.  On CrimeReportshart, you may see some flagged results in the testing called CBC. This test checks on the cells that make up your blood. The values that are flagged as outside normal range tell us about the characteristics/shape of your red blood cells.   We will monitor these labs periodically but do not pursue any further treatment as your overall blood levels are normal.

## 2022-10-04 ENCOUNTER — OFFICE VISIT (OUTPATIENT)
Dept: FAMILY MEDICINE CLINIC | Age: 27
End: 2022-10-04
Payer: COMMERCIAL

## 2022-10-04 VITALS
SYSTOLIC BLOOD PRESSURE: 112 MMHG | HEART RATE: 71 BPM | RESPIRATION RATE: 16 BRPM | BODY MASS INDEX: 31.26 KG/M2 | HEIGHT: 70 IN | OXYGEN SATURATION: 99 % | WEIGHT: 218.4 LBS | DIASTOLIC BLOOD PRESSURE: 64 MMHG | TEMPERATURE: 98.3 F

## 2022-10-04 DIAGNOSIS — M79.89 MASS OF SOFT TISSUE: ICD-10-CM

## 2022-10-04 DIAGNOSIS — F32.81 PMDD (PREMENSTRUAL DYSPHORIC DISORDER): ICD-10-CM

## 2022-10-04 DIAGNOSIS — D18.01 HEMANGIOMA OF SKIN: ICD-10-CM

## 2022-10-04 DIAGNOSIS — R55 SYNCOPE AND COLLAPSE: Primary | ICD-10-CM

## 2022-10-04 PROCEDURE — 99214 OFFICE O/P EST MOD 30 MIN: CPT | Performed by: STUDENT IN AN ORGANIZED HEALTH CARE EDUCATION/TRAINING PROGRAM

## 2022-10-04 RX ORDER — CLINDAMYCIN PHOSPHATE 10 MG/G
GEL TOPICAL
Qty: 1 EACH | Refills: 1 | Status: SHIPPED | OUTPATIENT
Start: 2022-10-04 | End: 2022-10-11

## 2022-10-04 RX ORDER — CITALOPRAM 40 MG/1
40 TABLET ORAL DAILY
Qty: 30 TABLET | Refills: 1 | Status: SHIPPED | OUTPATIENT
Start: 2022-10-04

## 2022-10-04 ASSESSMENT — ENCOUNTER SYMPTOMS
NAUSEA: 0
BACK PAIN: 1
COUGH: 0
ABDOMINAL PAIN: 0
SHORTNESS OF BREATH: 0
SORE THROAT: 0

## 2022-10-04 NOTE — PROGRESS NOTES
Health Maintenance Due   Topic Date Due    COVID-19 Vaccine (1) Never done    Varicella vaccine (1 of 2 - 2-dose childhood series) Never done    Pneumococcal 0-64 years Vaccine (1 - PCV) Never done    DTaP/Tdap/Td vaccine (1 - Tdap) Never done    Pap smear  Never done    Flu vaccine (1) Never done

## 2022-10-04 NOTE — PROGRESS NOTES
[id number] [facility] L[place] WILL [address] [address]  Dept: [phone]  Dept Fax: [phone]  Loc: [phone]  PROGRESS NOTE      Visit Date: 10/4/2022    Yousif Grajeda is a 32 y.o. female who presents today for:  Chief Complaint   Patient presents with    3 Month Follow-Up     Celexa also having red spots on Right shoulder that has changed shapes also Cyst on top of Left buttox and has been there for years and has gotten bigger but has now gotten smaller and concerned of attention issues due to fast talking and brain bounces around to multiple different things       Impression/Plan:  1. Syncope and collapse  Chronic, no further episodes  Single episode last year  Did not complete echo or tilt table  Had discussion with patient, will wait to pursue any further testing at this time    2. PMDD (premenstrual dysphoric disorder)  Chronic, improving  Will increase celexa to see if increased dose improves symptoms further  Having some attention problems, celexa may help with this as well  - citalopram (CELEXA) 40 MG tablet; Take 1 tablet by mouth daily  Dispense: 30 tablet; Refill: 1    3. Hemangioma of skin  Chronic, at baseline  Education provided    4. Mass of soft tissue  Chronic, at baseline  Small left sided mass above buttock. Drains at times and increases in size. Possibly pilonidal cyst vs sebaceous cyst vs lipoma  - US SOFT TISSUE ABSCESS DRAINAGE PERC; Future    Return in about 4 weeks (around 11/1/2022). Subjective:  HPI    Here for follow-up. Syncope and collapse:  Echo and tilt table not completed. No additional episodes. Will hold off from additional testing after discussion with patient. PMDD:  Started on Celexa 20 mg daily. Doesn't remember to take it every day, but has been compliant overall. Seems to help some. Having some issue with focus.   Willing to try increasing celexa to see if this helps as well.    Breast reduction:   Having worsening back pain as well. Struggling with walking. Went to chiropractor yesterday due to worsening neck pain. Did help. Going back on Thursday. Taking ibuprofen and stretching. Right shoulder:  skin changes - \"Strawberry spots\" since she was kid. They come and go.    ? Sebaceous cyst vs lipoma vs Pilonidal Cyst:  has been present for years, but acutely exacerbated last month. Above left buttocks. Drains at times. Comes and goes. No further questions of complaints. Review of Systems   Constitutional:  Negative for chills and fever. HENT:  Negative for congestion and sore throat. Eyes:  Negative for visual disturbance. Respiratory:  Negative for cough and shortness of breath. Cardiovascular:  Negative for chest pain. Gastrointestinal:  Negative for abdominal pain and nausea. Genitourinary:  Negative for dysuria. Musculoskeletal:  Positive for back pain and neck pain. Chronic back/neck pain secondary to large breast size   Skin:  Negative for rash. Small 'strawberry' spots   Neurological:  Negative for dizziness, syncope (No further episodes), light-headedness and headaches. Psychiatric/Behavioral:  The patient is not nervous/anxious. Patient Active Problem List   Diagnosis    Macromastia    Neck pain    Upper back pain    Chronic low back pain without sciatica    Kyphosis (acquired) (postural)    Frequent headaches    Chronic pain of both shoulders    Numbness and tingling in both hands    Intertrigo     No past medical history on file.    Past Surgical History:   Procedure Laterality Date    TONSILLECTOMY AND ADENOIDECTOMY  2006    WISDOM TOOTH EXTRACTION  2009     Family History   Problem Relation Age of Onset    Heart Disease Mother      Social History     Tobacco Use    Smoking status: Some Days    Smokeless tobacco: Never   Substance Use Topics    Alcohol use: Yes     Comment: occasional      Current Outpatient Medications Medication Sig Dispense Refill    clindamycin (CLEOCIN-T) 1 % gel Apply topically 2 times daily. 1 each 1    citalopram (CELEXA) 40 MG tablet Take 1 tablet by mouth daily 30 tablet 1     No current facility-administered medications for this visit. No Known Allergies      There is no immunization history on file for this patient.   Health Maintenance   Topic Date Due    COVID-19 Vaccine (1) Never done    Varicella vaccine (1 of 2 - 2-dose childhood series) Never done    Pneumococcal 0-64 years Vaccine (1 - PCV) Never done    DTaP/Tdap/Td vaccine (1 - Tdap) Never done    Pap smear  Never done    Flu vaccine (1) Never done    Depression Monitoring  06/29/2023    Hepatitis A vaccine  Aged Out    Hepatitis B vaccine  Aged Out    Hib vaccine  Aged Out    Meningococcal (ACWY) vaccine  Aged Out    Hepatitis C screen  Discontinued    HIV screen  Discontinued       LABS  No results found for: LABA1C  No results found for: EAG  No components found for: CHLPL  No results found for: TRIG  No results found for: HDL  No results found for: [address]    Chemistry        Component Value Date/Time     07/06/2022 1538    K 4.4 07/06/2022 1538     07/06/2022 1538    CO2 29 07/06/2022 1538    BUN 8 07/06/2022 1538    CREATININE 0.6 07/06/2022 1538        Component Value Date/Time    CALCIUM 9.2 07/06/2022 1538    ALKPHOS 48 07/06/2022 1538    AST 11 07/06/2022 1538    ALT 13 07/06/2022 1538    BILITOT 0.4 07/06/2022 1538          No results found for: LABMICR, [id number]  Lab Results   Component Value Date    TSH 3.600 07/06/2022     No results found for: PSA  Lab Results   Component Value Date    WBC 9.2 07/06/2022    HGB 13.1 07/06/2022    HCT 42.2 07/06/2022    MCV 84.9 07/06/2022     07/06/2022       Objective:  /64 (Site: Left Upper Arm, Position: Sitting, Cuff Size: Medium Adult)   Pulse 71   Temp 98.3 °F (36.8 °C) (Oral)   Resp 16   Ht 5' 10\" (1.778 m)   Wt 218 lb 6.4 oz (99.1 kg)   LMP 09/18/2022 SpO2 99%   BMI 31.34 kg/m²     Physical Exam  Constitutional:       General: She is not in acute distress. Appearance: Normal appearance. HENT:      Head: Normocephalic. Right Ear: External ear normal.      Left Ear: External ear normal.      Nose: Nose normal.      Mouth/Throat:      Mouth: Mucous membranes are moist.   Eyes:      General: No scleral icterus. Extraocular Movements: Extraocular movements intact. Cardiovascular:      Rate and Rhythm: Normal rate and regular rhythm. Pulses: Normal pulses. Heart sounds: Normal heart sounds. Pulmonary:      Effort: Pulmonary effort is normal.      Breath sounds: Normal breath sounds. Abdominal:      General: Abdomen is flat. There is no distension. Palpations: Abdomen is soft. Musculoskeletal:         General: Tenderness (back) present. Normal range of motion. Cervical back: Normal range of motion. Skin:     General: Skin is warm and dry. Comments: Small hemangiomas   Neurological:      Mental Status: She is alert. Motor: No weakness. Psychiatric:         Mood and Affect: Mood normal.       They voiced understanding. All questions answered. They agreed with treatment plan. See patient instructions for any educational materials that may have been given. Discussed use, benefit, and side effects of prescribed medications. Reviewed health maintenance.     (Please note that portions of this note may have been completed with a voice recognition program.  Efforts were made to edit the dictation but occasionally words are mis-transcribed.)      Electronically signed by Tea Gallo DO on 10/4/2022 at 7:43 PM

## 2022-11-08 ENCOUNTER — OFFICE VISIT (OUTPATIENT)
Dept: FAMILY MEDICINE CLINIC | Age: 27
End: 2022-11-08
Payer: COMMERCIAL

## 2022-11-08 VITALS
OXYGEN SATURATION: 100 % | HEIGHT: 70 IN | WEIGHT: 212.6 LBS | SYSTOLIC BLOOD PRESSURE: 114 MMHG | HEART RATE: 92 BPM | DIASTOLIC BLOOD PRESSURE: 78 MMHG | TEMPERATURE: 97.7 F | BODY MASS INDEX: 30.43 KG/M2

## 2022-11-08 DIAGNOSIS — K64.4 EXTERNAL PROLAPSED HEMORRHOIDS: Primary | ICD-10-CM

## 2022-11-08 DIAGNOSIS — F32.81 PMDD (PREMENSTRUAL DYSPHORIC DISORDER): ICD-10-CM

## 2022-11-08 PROCEDURE — 99213 OFFICE O/P EST LOW 20 MIN: CPT | Performed by: STUDENT IN AN ORGANIZED HEALTH CARE EDUCATION/TRAINING PROGRAM

## 2022-11-08 RX ORDER — CITALOPRAM 40 MG/1
40 TABLET ORAL DAILY
Qty: 30 TABLET | Refills: 10 | Status: SHIPPED | OUTPATIENT
Start: 2022-11-08

## 2022-11-08 ASSESSMENT — ENCOUNTER SYMPTOMS
COUGH: 0
SORE THROAT: 0
NAUSEA: 0
ABDOMINAL PAIN: 0
SHORTNESS OF BREATH: 0

## 2022-11-08 NOTE — PROGRESS NOTES
S: 32 y.o. female with   Chief Complaint   Patient presents with    1 Month Follow-Up           Hemorrhoids     Non-painful    Flu Vaccine       HPI: please see resident note for HPI and ROS. 58-year-old female presenting to the office for a follow-up on left lower back lump that has resolved. She also complains of hemorrhoids-denies pain or bleeding, does strain with bowel movements. Asking for referral to have these managed surgically. Premenstrual dysphoric disorder is stable with Celexa. BP Readings from Last 3 Encounters:   11/08/22 114/78   10/04/22 112/64   07/06/22 110/70     Wt Readings from Last 3 Encounters:   11/08/22 212 lb 9.6 oz (96.4 kg)   10/04/22 218 lb 6.4 oz (99.1 kg)   07/06/22 221 lb 9.6 oz (100.5 kg)       O: VS:  height is 5' 10\" (1.778 m) and weight is 212 lb 9.6 oz (96.4 kg). Her temporal temperature is 97.7 °F (36.5 °C). Her blood pressure is 114/78 and her pulse is 92. Her oxygen saturation is 100%. Diagnosis Orders   1. External prolapsed hemorrhoids  [facility] - Adelita Amador MD, Gastroenterology, [address]      2. PMDD (premenstrual dysphoric disorder)  citalopram (CELEXA) 40 MG tablet          Plan:  Please refer to resident note for full plan. Prolapsed hemorrhoid: Chronic, uncontrolled. No associated pain or bleeding but is bothersome to patient. Recommend controlling constipation/straining with bowel movements. OK for referral to gastroenterology per patient request.    PMDD: Chronic, stable. Continue Celexa as prescribed. Follow-up in 3 months for recheck.     Health Maintenance Due   Topic Date Due    COVID-19 Vaccine (1) Never done    Varicella vaccine (1 of 2 - 2-dose childhood series) Never done    Pneumococcal 0-64 years Vaccine (1 - PCV) Never done    DTaP/Tdap/Td vaccine (1 - Tdap) Never done    Pap smear  Never done    Flu vaccine (1) Never done       Attending Physician Statement  I have discussed the case, including pertinent history and exam findings with the resident. I agree with the documented assessment and plan as documented by the resident.         Emili White DO 11/9/2022 8:51 PM

## 2022-11-08 NOTE — PROGRESS NOTES
[address] [facility] [address] [address]  Dept: [phone]  Dept Fax: [phone]  Loc: [phone]  PROGRESS NOTE      Visit Date: 11/8/2022    Juany Amanda is a 32 y.o. female who presents today for:  Chief Complaint   Patient presents with    1 Month Follow-Up           Hemorrhoids     Non-painful    Flu Vaccine       Impression/Plan:  1. External prolapsed hemorrhoids  Chronic, stable  States that she has had these prolapse hemorrhoids for several years  Encouraged her to avoid straining during her bowel movements  Referral made to GI for banding  - [facility] - Remigio Angulo MD, Gastroenterology, [address]    2. PMDD (premenstrual dysphoric disorder)  Chronic, controlled  Continue Celexa  Refills today  - citalopram (CELEXA) 40 MG tablet; Take 1 tablet by mouth daily  Dispense: 30 tablet; Refill: 10      Return in about 3 months (around 2/8/2023) for Chronic. Subjective:  HPI    Here for follow-up. Concern for hemorrhoids. She states that she has had this for several years. She notices it due to feels like a mass protruding from her anus at times, especially after bowel movement. She is able to manually reduce this. She like to discuss options today. PMDD: Doing very well on Celexa, like to continue this medication. She did not get the ultrasound of the small lump on her left lower back. She felt that it resolved after his last episode of draining just prior to seeing us. She will let us know if it returns to we can discuss further management. She would like to watch and wait at this time. No further questions of complaints. Review of Systems   Constitutional:  Negative for chills and fever. HENT:  Negative for congestion and sore throat. Eyes:  Negative for visual disturbance. Respiratory:  Negative for cough and shortness of breath. Cardiovascular:  Negative for chest pain. Gastrointestinal:  Negative for abdominal pain and nausea. Concern for hemorrhoids   Genitourinary:  Negative for dysuria. Skin:  Negative for rash. Neurological:  Negative for dizziness, light-headedness and headaches. Psychiatric/Behavioral:  The patient is not nervous/anxious. Patient Active Problem List   Diagnosis    Macromastia    Neck pain    Upper back pain    Chronic low back pain without sciatica    Kyphosis (acquired) (postural)    Frequent headaches    Chronic pain of both shoulders    Numbness and tingling in both hands    Intertrigo     History reviewed. No pertinent past medical history. Past Surgical History:   Procedure Laterality Date    TONSILLECTOMY AND ADENOIDECTOMY  2006    WISDOM TOOTH EXTRACTION  2009     Family History   Problem Relation Age of Onset    Heart Disease Mother      Social History     Tobacco Use    Smoking status: Some Days    Smokeless tobacco: Never   Substance Use Topics    Alcohol use: Yes     Comment: occasional      Current Outpatient Medications   Medication Sig Dispense Refill    citalopram (CELEXA) 40 MG tablet Take 1 tablet by mouth daily 30 tablet 10     No current facility-administered medications for this visit. No Known Allergies      There is no immunization history on file for this patient.   Health Maintenance   Topic Date Due    COVID-19 Vaccine (1) Never done    Varicella vaccine (1 of 2 - 2-dose childhood series) Never done    Pneumococcal 0-64 years Vaccine (1 - PCV) Never done    DTaP/Tdap/Td vaccine (1 - Tdap) Never done    Pap smear  Never done    Flu vaccine (1) Never done    Depression Monitoring  06/29/2023    Hepatitis A vaccine  Aged Out    Hib vaccine  Aged Out    Meningococcal (ACWY) vaccine  Aged Out    Hepatitis C screen  Discontinued    HIV screen  Discontinued       LABS  No results found for: LABA1C  No results found for: EAG  No components found for: CHLPL  No results found for: TRIG  No results found for: HDL  No results found for: [address]    Chemistry        Component Value Date/Time     07/06/2022 1538    K 4.4 07/06/2022 1538     07/06/2022 1538    CO2 29 07/06/2022 1538    BUN 8 07/06/2022 1538    CREATININE 0.6 07/06/2022 1538        Component Value Date/Time    CALCIUM 9.2 07/06/2022 1538    ALKPHOS 48 07/06/2022 1538    AST 11 07/06/2022 1538    ALT 13 07/06/2022 1538    BILITOT 0.4 07/06/2022 1538          No results found for: Charmayne Ervin  Lab Results   Component Value Date    TSH 3.600 07/06/2022     No results found for: PSA  Lab Results   Component Value Date    WBC 9.2 07/06/2022    HGB 13.1 07/06/2022    HCT 42.2 07/06/2022    MCV 84.9 07/06/2022     07/06/2022       Objective:  /78 (Site: Left Upper Arm, Position: Sitting, Cuff Size: Medium Adult)   Pulse 92   Temp 97.7 °F (36.5 °C) (Temporal)   Ht 5' 10\" (1.778 m)   Wt 212 lb 9.6 oz (96.4 kg)   SpO2 100%   BMI 30.50 kg/m²     Physical Exam  Exam conducted with a chaperone present. Constitutional:       General: She is not in acute distress. Appearance: Normal appearance. HENT:      Head: Normocephalic. Right Ear: External ear normal.      Left Ear: External ear normal.      Nose: Nose normal.      Mouth/Throat:      Mouth: Mucous membranes are moist.   Eyes:      General: No scleral icterus. Extraocular Movements: Extraocular movements intact. Cardiovascular:      Rate and Rhythm: Normal rate and regular rhythm. Pulses: Normal pulses. Heart sounds: Normal heart sounds. Pulmonary:      Effort: Pulmonary effort is normal.      Breath sounds: Normal breath sounds. Abdominal:      General: Abdomen is flat. There is no distension. Palpations: Abdomen is soft. Genitourinary:      Musculoskeletal:         General: Normal range of motion. Cervical back: Normal range of motion. Skin:     General: Skin is warm and dry. Neurological:      Mental Status: She is alert.       Motor: No weakness. Psychiatric:         Mood and Affect: Mood normal.       They voiced understanding. All questions answered. They agreed with treatment plan. See patient instructions for any educational materials that may have been given. Discussed use, benefit, and side effects of prescribed medications. Reviewed health maintenance.     (Please note that portions of this note may have been completed with a voice recognition program.  Efforts were made to edit the dictation but occasionally words are mis-transcribed.)      Electronically signed by Ana Downey DO on 11/8/2022 at 6:01 PM

## 2023-02-20 ENCOUNTER — OFFICE VISIT (OUTPATIENT)
Dept: FAMILY MEDICINE CLINIC | Age: 28
End: 2023-02-20
Payer: COMMERCIAL

## 2023-02-20 VITALS
BODY MASS INDEX: 30.67 KG/M2 | DIASTOLIC BLOOD PRESSURE: 78 MMHG | OXYGEN SATURATION: 98 % | HEIGHT: 70 IN | RESPIRATION RATE: 16 BRPM | HEART RATE: 91 BPM | SYSTOLIC BLOOD PRESSURE: 104 MMHG | WEIGHT: 214.2 LBS | TEMPERATURE: 97.2 F

## 2023-02-20 DIAGNOSIS — F32.81 PMDD (PREMENSTRUAL DYSPHORIC DISORDER): ICD-10-CM

## 2023-02-20 DIAGNOSIS — F41.9 ANXIETY: ICD-10-CM

## 2023-02-20 DIAGNOSIS — F33.0 MILD EPISODE OF RECURRENT MAJOR DEPRESSIVE DISORDER (HCC): Primary | ICD-10-CM

## 2023-02-20 PROCEDURE — 99213 OFFICE O/P EST LOW 20 MIN: CPT | Performed by: STUDENT IN AN ORGANIZED HEALTH CARE EDUCATION/TRAINING PROGRAM

## 2023-02-20 RX ORDER — BUSPIRONE HYDROCHLORIDE 5 MG/1
5 TABLET ORAL 3 TIMES DAILY
Qty: 90 TABLET | Refills: 0 | Status: SHIPPED | OUTPATIENT
Start: 2023-02-20 | End: 2023-03-22

## 2023-02-20 RX ORDER — CITALOPRAM 40 MG/1
40 TABLET ORAL DAILY
Qty: 90 TABLET | Refills: 3 | Status: SHIPPED | OUTPATIENT
Start: 2023-02-20 | End: 2024-02-15

## 2023-02-20 SDOH — ECONOMIC STABILITY: FOOD INSECURITY: WITHIN THE PAST 12 MONTHS, YOU WORRIED THAT YOUR FOOD WOULD RUN OUT BEFORE YOU GOT MONEY TO BUY MORE.: NEVER TRUE

## 2023-02-20 SDOH — ECONOMIC STABILITY: HOUSING INSECURITY
IN THE LAST 12 MONTHS, WAS THERE A TIME WHEN YOU DID NOT HAVE A STEADY PLACE TO SLEEP OR SLEPT IN A SHELTER (INCLUDING NOW)?: NO

## 2023-02-20 SDOH — ECONOMIC STABILITY: INCOME INSECURITY: HOW HARD IS IT FOR YOU TO PAY FOR THE VERY BASICS LIKE FOOD, HOUSING, MEDICAL CARE, AND HEATING?: NOT HARD AT ALL

## 2023-02-20 SDOH — ECONOMIC STABILITY: FOOD INSECURITY: WITHIN THE PAST 12 MONTHS, THE FOOD YOU BOUGHT JUST DIDN'T LAST AND YOU DIDN'T HAVE MONEY TO GET MORE.: NEVER TRUE

## 2023-02-20 ASSESSMENT — PATIENT HEALTH QUESTIONNAIRE - PHQ9
SUM OF ALL RESPONSES TO PHQ QUESTIONS 1-9: 20
SUM OF ALL RESPONSES TO PHQ QUESTIONS 1-9: 20
4. FEELING TIRED OR HAVING LITTLE ENERGY: 1
SUM OF ALL RESPONSES TO PHQ9 QUESTIONS 1 & 2: 4
5. POOR APPETITE OR OVEREATING: 3
7. TROUBLE CONCENTRATING ON THINGS, SUCH AS READING THE NEWSPAPER OR WATCHING TELEVISION: 3
3. TROUBLE FALLING OR STAYING ASLEEP: 3
9. THOUGHTS THAT YOU WOULD BE BETTER OFF DEAD, OR OF HURTING YOURSELF: 0
2. FEELING DOWN, DEPRESSED OR HOPELESS: 3
10. IF YOU CHECKED OFF ANY PROBLEMS, HOW DIFFICULT HAVE THESE PROBLEMS MADE IT FOR YOU TO DO YOUR WORK, TAKE CARE OF THINGS AT HOME, OR GET ALONG WITH OTHER PEOPLE: 2
SUM OF ALL RESPONSES TO PHQ QUESTIONS 1-9: 20
8. MOVING OR SPEAKING SO SLOWLY THAT OTHER PEOPLE COULD HAVE NOTICED. OR THE OPPOSITE, BEING SO FIGETY OR RESTLESS THAT YOU HAVE BEEN MOVING AROUND A LOT MORE THAN USUAL: 3
1. LITTLE INTEREST OR PLEASURE IN DOING THINGS: 1
SUM OF ALL RESPONSES TO PHQ QUESTIONS 1-9: 20
6. FEELING BAD ABOUT YOURSELF - OR THAT YOU ARE A FAILURE OR HAVE LET YOURSELF OR YOUR FAMILY DOWN: 3

## 2023-02-20 ASSESSMENT — ENCOUNTER SYMPTOMS
EYE PAIN: 0
SORE THROAT: 0
NAUSEA: 0
CHEST TIGHTNESS: 0
COUGH: 0
SHORTNESS OF BREATH: 0
RHINORRHEA: 0
SINUS PAIN: 0
DIARRHEA: 0
ABDOMINAL PAIN: 0
VOMITING: 0
BLOOD IN STOOL: 0

## 2023-02-20 ASSESSMENT — COLUMBIA-SUICIDE SEVERITY RATING SCALE - C-SSRS
6. HAVE YOU EVER DONE ANYTHING, STARTED TO DO ANYTHING, OR PREPARED TO DO ANYTHING TO END YOUR LIFE?: NO
2. HAVE YOU ACTUALLY HAD ANY THOUGHTS OF KILLING YOURSELF?: NO
1. WITHIN THE PAST MONTH, HAVE YOU WISHED YOU WERE DEAD OR WISHED YOU COULD GO TO SLEEP AND NOT WAKE UP?: NO

## 2023-02-20 NOTE — PATIENT INSTRUCTIONS
Thank you   Thank you for trusting us with your healthcare needs. You may receive a survey regarding today's visit. It would help us out if you would take a few moments to provide your feedback. We value your input.  Please bring in ALL medications BOTTLES, including inhalers, herbal supplements, over the counter, prescribed & non-prescribed medicine. The office would like actual medication bottles and a list.   Please note our OFFICE POLICIES:   Prior to getting your labs drawn, please check with your insurance company for benefits and eligibility of lab services. Often, insurance companies cover certain tests for preventative visits only. It is patient's responsibility to see what is covered.   We are unable to change a diagnosis after the test has been performed.   Lab orders will not be re-printed. Please hold onto your original lab orders and take them to your lab to be completed.   If you no show your scheduled appointment three times, you will be dismissed from this practice.   Reschedules must be completed 24 hours prior to your schedule appointment.   If the list below has been completed, PLEASE FAX RECORDS TO OUR OFFICE @ [phone]. Once the records have been received we will update your records at our office:  Health Maintenance Due   Topic Date Due    COVID-19 Vaccine (1) Never done    Varicella vaccine (1 of 2 - 2-dose childhood series) Never done    Pneumococcal 0-64 years Vaccine (1 - PCV) Never done    DTaP/Tdap/Td vaccine (1 - Tdap) Never done    Pap smear  Never done    Flu vaccine (1) Never done

## 2023-02-20 NOTE — PROGRESS NOTES
Estrellita Sommers is a 32 y.o. female who presents today for:  Anxiety/ hyperarousal         HPI:   Estrellita Sommers is 32 y.o. who presents today for follow up. Acute complaints discussed today include: Anxiety: Sates that she feels that her life is a with mile a mintue and  deals with increased activity since teenager. Always going and moving from activity to activity. At that time she was sleeping constantly. Has always over compensated. Fidget a lot. Feels like she talks very fast. Sometimes able to focus by having multiple outlets of activity. Sometimes feels overwhelmed. Gets a lot done for 2 hours but then crashes. Problem with memory. Reports feeling this everyday. Impacting work where she can get a lot done but then gets overwhelmed and shuts down. Has to push and talk self through it. Has not seen counseling for this issue. Has never been on a controlled substance for ADHD. PHQ-9 20. Chronic conditions discussed today include:    PMDD (premenstrual dysphoric disorder):   Helping with the celexa and does not refill. Wishes to go to 90 day on next refill  External Hemorrhoids   Colonoscopy by Dr. Silvana Ferreira, 2 banding, fell off no complication. Possible 2 more banding. Follow-up on 2/28/2022      Objective: There were no vitals filed for this visit.     Wt Readings from Last 3 Encounters:   11/08/22 212 lb 9.6 oz (96.4 kg)   10/04/22 218 lb 6.4 oz (99.1 kg)   07/06/22 221 lb 9.6 oz (100.5 kg)       BP Readings from Last 3 Encounters:   11/08/22 114/78   10/04/22 112/64   07/06/22 110/70       Lab Results   Component Value Date    WBC 9.2 07/06/2022    HGB 13.1 07/06/2022    HCT 42.2 07/06/2022    MCV 84.9 07/06/2022     07/06/2022     Lab Results   Component Value Date     07/06/2022    K 4.4 07/06/2022     07/06/2022    CO2 29 07/06/2022    BUN 8 07/06/2022    CREATININE 0.6 07/06/2022    GLUCOSE 93 07/06/2022    CALCIUM 9.2 07/06/2022    PROT 6.4 07/06/2022 LABALBU 4.2 07/06/2022    BILITOT 0.4 07/06/2022    ALKPHOS 48 07/06/2022    AST 11 07/06/2022    ALT 13 07/06/2022    LABGLOM >90 07/06/2022     Lab Results   Component Value Date    TSH 3.600 07/06/2022    T4FREE 1.27 07/06/2022     No results found for: LABA1C  No results found for: EAG  No results found for: CHOL  No results found for: TRIG  No results found for: HDL  No results found for: LDLCHOLESTEROL, LDLCALC    No results found for: LABMICR, [id number]    Review of Systems   Constitutional:  Positive for diaphoresis (at night, soaked in sweat). Negative for chills and fever. HENT:  Negative for ear pain, rhinorrhea and sinus pain. Eyes:  Negative for pain and visual disturbance. Respiratory:  Negative for cough, chest tightness and shortness of breath. Cardiovascular:  Negative for chest pain and palpitations. Gastrointestinal:  Negative for blood in stool, diarrhea, nausea and vomiting. Genitourinary:  Negative for hematuria. Neurological:  Negative for tremors, syncope and light-headedness. Psychiatric/Behavioral:  Positive for agitation. Negative for hallucinations and self-injury. The patient is nervous/anxious. Physical Exam  Constitutional:       General: She is not in acute distress. Appearance: Normal appearance. She is not toxic-appearing. Neck:      Thyroid: No thyroid mass, thyromegaly or thyroid tenderness. Cardiovascular:      Rate and Rhythm: Normal rate and regular rhythm. Heart sounds: No murmur heard. No gallop. Pulmonary:      Effort: Pulmonary effort is normal.      Breath sounds: Normal breath sounds. No wheezing or rales. Musculoskeletal:      Cervical back: Neck supple. Neurological:      Mental Status: She is alert. There is no immunization history on file for this patient.     Health Maintenance Due   Topic Date Due    COVID-19 Vaccine (1) Never done    Varicella vaccine (1 of 2 - 2-dose childhood series) Never done Pneumococcal 0-64 years Vaccine (1 - PCV) Never done    DTaP/Tdap/Td vaccine (1 - Tdap) Never done    Pap smear  Never done    Flu vaccine (1) Never done       Food Insecurity: No Food Insecurity    Worried About Running Out of Food in the Last Year: Never true    Ran Out of Food in the Last Year: Never true       Assessment / Plan:   27 year old well appearing female presenting for follow-up   Anxiety: Discussed with patient the different manifestations of anxiety. Differential diagnosis included ADHD but patient agreed to try and control anxiety before starting ADHD medication. Start Buspar 5MG table oral TID. Can start at BID if she feels the med works at that dose. Follow up in on month to discuss anxiety.   PMDD (premenstrual dysphoric disorder): Stable menstrual symptoms Refill Celexa  40MG oral daily 90 day supply  External Hemorrhoids: Managed by Dr. Her. Continue care at [facility] office. Follow-up schedules for 2/28/2023.                    Medications Prescribed:  No orders of the defined types were placed in this encounter.      Future Appointments   Date Time Provider Department Center   2/20/2023 11:00 AM Gabriela Sage DO [facility]       Patient given educational materials - see patient instructions.  Discussed use, benefit, and sideeffects of prescribed medications.  All patient questions answered.  Pt voiced understanding. Reviewed health maintenance.  Instructed to continue current medications, diet and exercise.  Patient agreed with treatment plan.Follow up as directed.     Plan discussed with attending physician.     Electronically signed by Ian Morales on 2/20/2023 at 10:25 AM

## 2023-02-20 NOTE — PROGRESS NOTES
[address] [facility] [address] [facility] [address]  Dept: [phone]  Dept Fax: [phone]  Loc: [phone]  PROGRESS NOTE      Visit Date: 2/20/2023    Crow Weber is a 32 y.o. female who presents today for:  Chief Complaint   Patient presents with    3 Month Follow-Up     Anxiety and Stress and would like to discuss options to help with this       Impression/Plan:  1. Mild episode of recurrent major depressive disorder (HCC)  Acute on chronic, mild  Continue celexa  Will add buspar to help with anxiety component to see if this helps with overall mood/PHQ9 findings    2. Anxiety  Acute on chronic, mild-moderate  Continue celexa and add buspar  Consider increasing buspar at next visit if well tolerated  Does not seem to be ADD or bipolar involvement at this time, but will continue to watch closely  - busPIRone (BUSPAR) 5 MG tablet; Take 1 tablet by mouth 3 times daily  Dispense: 90 tablet; Refill: 0    3. PMDD (premenstrual dysphoric disorder)  Chronic, stable  Continue celexa  - citalopram (CELEXA) 40 MG tablet; Take 1 tablet by mouth daily  Dispense: 90 tablet; Refill: 3      Return in about 4 weeks (around 3/20/2023) for anxiety and depression. Subjective:  HPI    Here today for follow-up. PMDD/Depression/Anxiety:  she feels like her PMDD is well controlled as far the timing with her cycle goes. She has been very busy lately and feels as though her mood has been good overall. However, her PHQ9 was elevated today. She denies any SI/HI. Endorses poor sleep due to frequent awakenings/difficulties falling asleep at night. Does endorse poor concentration and feels as though her mind has been racing. She wonders if she has undiagnosed ADD/ADHD.   We had a long discussion regarding her current work situation (being very busy), her mind racing (especially at bedtime), and how her poor concentration has been affecting her ability to consistently finish tasks. She denies any manic-type symptoms. No further questions of complaints. Review of Systems   Constitutional:  Negative for chills and fever. HENT:  Negative for congestion and sore throat. Eyes:  Negative for visual disturbance. Respiratory:  Negative for cough and shortness of breath. Cardiovascular:  Negative for chest pain. Gastrointestinal:  Negative for abdominal pain and nausea. Genitourinary:  Negative for dysuria. Skin:  Negative for rash. Neurological:  Negative for dizziness, light-headedness and headaches. Psychiatric/Behavioral:  Positive for decreased concentration and sleep disturbance. Negative for agitation, confusion and suicidal ideas. The patient is nervous/anxious. The patient is not hyperactive. Patient Active Problem List   Diagnosis    Macromastia    Neck pain    Upper back pain    Chronic low back pain without sciatica    Kyphosis (acquired) (postural)    Frequent headaches    Chronic pain of both shoulders    Numbness and tingling in both hands    Intertrigo     History reviewed. No pertinent past medical history. Past Surgical History:   Procedure Laterality Date    TONSILLECTOMY AND ADENOIDECTOMY  2006    WISDOM TOOTH EXTRACTION  2009     Family History   Problem Relation Age of Onset    Heart Disease Mother      Social History     Tobacco Use    Smoking status: Some Days    Smokeless tobacco: Never   Substance Use Topics    Alcohol use: Yes     Comment: occasional      Current Outpatient Medications   Medication Sig Dispense Refill    citalopram (CELEXA) 40 MG tablet Take 1 tablet by mouth daily 90 tablet 3    busPIRone (BUSPAR) 5 MG tablet Take 1 tablet by mouth 3 times daily 90 tablet 0     No current facility-administered medications for this visit. Allergies   Allergen Reactions    Ciprofloxacin      Upset Stomach         There is no immunization history on file for this patient.   Health Maintenance   Topic Date Due    COVID-19 Vaccine (1) Never done    Varicella vaccine (1 of 2 - 2-dose childhood series) Never done    Pneumococcal 0-64 years Vaccine (1 - PCV) Never done    DTaP/Tdap/Td vaccine (1 - Tdap) Never done    Pap smear  Never done    Flu vaccine (1) Never done    Depression Monitoring  06/29/2023    Hepatitis A vaccine  Aged Out    Hib vaccine  Aged Out    Meningococcal (ACWY) vaccine  Aged Out    Hepatitis C screen  Discontinued    HIV screen  Discontinued       LABS  No results found for: LABA1C  No results found for: EAG  No components found for: CHLPL  No results found for: TRIG  No results found for: HDL  No results found for: [address]    Chemistry        Component Value Date/Time     07/06/2022 1538    K 4.4 07/06/2022 1538     07/06/2022 1538    CO2 29 07/06/2022 1538    BUN 8 07/06/2022 1538    CREATININE 0.6 07/06/2022 1538        Component Value Date/Time    CALCIUM 9.2 07/06/2022 1538    ALKPHOS 48 07/06/2022 1538    AST 11 07/06/2022 1538    ALT 13 07/06/2022 1538    BILITOT 0.4 07/06/2022 1538          No results found for: Zac Dk  Lab Results   Component Value Date    TSH 3.600 07/06/2022     No results found for: PSA  Lab Results   Component Value Date    WBC 9.2 07/06/2022    HGB 13.1 07/06/2022    HCT 42.2 07/06/2022    MCV 84.9 07/06/2022     07/06/2022       Objective:  /78 (Site: Left Upper Arm, Position: Sitting, Cuff Size: Medium Adult)   Pulse 91   Temp 97.2 °F (36.2 °C) (Temporal)   Resp 16   Ht 5' 10\" (1.778 m)   Wt 214 lb 3.2 oz (97.2 kg)   LMP 01/29/2023   SpO2 98%   BMI 30.73 kg/m²     Physical Exam  Constitutional:       General: She is not in acute distress. Appearance: Normal appearance. HENT:      Head: Normocephalic. Right Ear: External ear normal.      Left Ear: External ear normal.      Nose: Nose normal.      Mouth/Throat:      Mouth: Mucous membranes are moist.   Eyes:      General: No scleral icterus.      Extraocular Movements: Extraocular movements intact. Cardiovascular:      Rate and Rhythm: Normal rate and regular rhythm. Pulses: Normal pulses. Heart sounds: Normal heart sounds. Pulmonary:      Effort: Pulmonary effort is normal.      Breath sounds: Normal breath sounds. Abdominal:      General: Abdomen is flat. There is no distension. Palpations: Abdomen is soft. Musculoskeletal:         General: Normal range of motion. Cervical back: Normal range of motion. Skin:     General: Skin is warm and dry. Neurological:      Mental Status: She is alert. Motor: No weakness. Psychiatric:         Attention and Perception: Attention and perception normal.         Mood and Affect: Mood is anxious. Mood is not depressed. Speech: Speech normal.         Behavior: Behavior normal. Behavior is cooperative. Thought Content: Thought content normal. Thought content does not include homicidal or suicidal ideation. Cognition and Memory: Cognition and memory normal.         Judgment: Judgment normal.       They voiced understanding. All questions answered. They agreed with treatment plan. See patient instructions for any educational materials that may have been given. Discussed use, benefit, and side effects of prescribed medications. Reviewed health maintenance.     (Please note that portions of this note may have been completed with a voice recognition program.  Efforts were made to edit the dictation but occasionally words are mis-transcribed.)      Electronically signed by Sonal Mccormick DO on 2/20/2023 at 6:29 PM

## 2023-02-20 NOTE — PROGRESS NOTES
S: 32 y.o. female with   Chief Complaint   Patient presents with    3 Month Follow-Up     Anxiety and Stress and would like to discuss options to help with this       HPI: please see resident note for HPI and ROS. BP Readings from Last 3 Encounters:   02/20/23 104/78   11/08/22 114/78   10/04/22 112/64     Wt Readings from Last 3 Encounters:   02/20/23 214 lb 3.2 oz (97.2 kg)   11/08/22 212 lb 9.6 oz (96.4 kg)   10/04/22 218 lb 6.4 oz (99.1 kg)       O: VS:  height is 5' 10\" (1.778 m) and weight is 214 lb 3.2 oz (97.2 kg). Her temporal temperature is 97.2 °F (36.2 °C). Her blood pressure is 104/78 and her pulse is 91. Her respiration is 16 and oxygen saturation is 98%. Physical exam performed by resident physician. Diagnosis Orders   1. Mild episode of recurrent major depressive disorder ([facility] [address]        2. Anxiety  busPIRone (BUSPAR) 5 MG tablet      3. PMDD (premenstrual dysphoric disorder)  citalopram (CELEXA) 40 MG tablet          Plan:  Please refer to resident note for full plan. 64-year-old female presents the office to follow-up on premenstrual dysphoric disorder and anxiety. Patient seems to have somewhat controlled premenstrual dysphoric disorder on Celexa 40 mg daily. Does still have concerns for depression but overall stable at this time. Continue Celexa as above. Patient also has uncontrolled anxiety where she has trouble concentrating, completing Tasks, racing thoughts. No concern for underlying bipolar at this point. We will plan to treat BuSpar 5 mg 3 times daily as needed. Could consider ADHD management the future if unable to be controlled on anxiety medication. Recent history of external hemorrhoids status post banding completed by Dr. Crys Rg. Status post colonoscopy waiting for results. We will plan to follow-up at next appointment in 1 month for medication reevaluation.       Health Maintenance Due   Topic Date Due    COVID-19 Vaccine (1) Never done    Varicella vaccine (1 of 2 - 2-dose childhood series) Never done    Pneumococcal 0-64 years Vaccine (1 - PCV) Never done    DTaP/Tdap/Td vaccine (1 - Tdap) Never done    Pap smear  Never done    Flu vaccine (1) Never done       Attending Physician Statement  I have discussed the case, including pertinent history and exam findings with the resident. I agree with the documented assessment and plan as documented by the resident.   LUKE Mcclendon DO 2/21/2023 7:00 AM

## 2023-06-02 ENCOUNTER — HOSPITAL ENCOUNTER (EMERGENCY)
Age: 28
Discharge: HOME | End: 2023-06-02
Payer: COMMERCIAL

## 2023-06-02 VITALS
RESPIRATION RATE: 18 BRPM | HEART RATE: 75 BPM | SYSTOLIC BLOOD PRESSURE: 91 MMHG | TEMPERATURE: 97.8 F | DIASTOLIC BLOOD PRESSURE: 69 MMHG | OXYGEN SATURATION: 98 %

## 2023-06-02 DIAGNOSIS — L25.5 DERMATITIS DUE TO PLANTS, INCLUDING POISON IVY, SUMAC, AND OAK: Primary | ICD-10-CM

## 2023-06-02 PROCEDURE — 99213 OFFICE O/P EST LOW 20 MIN: CPT | Performed by: NURSE PRACTITIONER

## 2023-06-02 PROCEDURE — 6360000002 HC RX W HCPCS: Performed by: NURSE PRACTITIONER

## 2023-06-02 PROCEDURE — 99213 OFFICE O/P EST LOW 20 MIN: CPT

## 2023-06-02 PROCEDURE — 96372 THER/PROPH/DIAG INJ SC/IM: CPT

## 2023-06-02 RX ORDER — METHYLPREDNISOLONE ACETATE 80 MG/ML
80 INJECTION, SUSPENSION INTRA-ARTICULAR; INTRALESIONAL; INTRAMUSCULAR; SOFT TISSUE ONCE
Status: COMPLETED | OUTPATIENT
Start: 2023-06-02 | End: 2023-06-02

## 2023-06-02 RX ORDER — PREDNISONE 20 MG/1
TABLET ORAL
Qty: 10 TABLET | Refills: 0 | Status: SHIPPED | OUTPATIENT
Start: 2023-06-02 | End: 2023-06-09

## 2023-06-02 RX ORDER — BUSPIRONE HYDROCHLORIDE 5 MG/1
5 TABLET ORAL 3 TIMES DAILY
COMMUNITY

## 2023-06-02 RX ADMIN — METHYLPREDNISOLONE ACETATE 80 MG: 80 INJECTION, SUSPENSION INTRA-ARTICULAR; INTRALESIONAL; INTRAMUSCULAR; SOFT TISSUE at 12:49

## 2023-06-02 ASSESSMENT — PAIN - FUNCTIONAL ASSESSMENT
PAIN_FUNCTIONAL_ASSESSMENT: NONE - DENIES PAIN
PAIN_FUNCTIONAL_ASSESSMENT: NONE - DENIES PAIN

## 2023-06-02 ASSESSMENT — ENCOUNTER SYMPTOMS
SHORTNESS OF BREATH: 0
NAUSEA: 0
TROUBLE SWALLOWING: 0
CHEST TIGHTNESS: 0
WHEEZING: 0

## 2023-06-02 NOTE — ED PROVIDER NOTES
[place] [address]  Urgent Care Encounter      CHIEF COMPLAINT       Chief Complaint   Patient presents with    Rash       Nurses Notes reviewed and I agree except as noted in the HPI. HISTORY OF PRESENT ILLNESS   Peña Lopez is a 25 y.o. female who presents for evaluation of rash. Onset of symptoms between 5 and 7 days ago, unchanged. Rash now spread to her face. Associate pruritus. Possible exposure to poison ivy. No improvement with current treatment. REVIEW OF SYSTEMS     Review of Systems   Constitutional:  Negative for fatigue and fever. HENT:  Negative for trouble swallowing. Respiratory:  Negative for chest tightness, shortness of breath and wheezing. Cardiovascular:  Negative for chest pain. Gastrointestinal:  Negative for nausea. Musculoskeletal:  Negative for neck pain and neck stiffness. Skin:  Positive for rash. Neurological:  Negative for headaches. Hematological:  Negative for adenopathy. PAST MEDICAL HISTORY   History reviewed. No pertinent past medical history. SURGICAL HISTORY     Patient  has a past surgical history that includes Tonsillectomy and adenoidectomy (2006) and [place] tooth extraction (2009). CURRENT MEDICATIONS       Previous Medications    BUSPIRONE (BUSPAR) 5 MG TABLET    Take 1 tablet by mouth 3 times daily    CITALOPRAM (CELEXA) 40 MG TABLET    Take 1 tablet by mouth daily       ALLERGIES     Patient is is allergic to ciprofloxacin. FAMILY HISTORY     Patient'sfamily history includes Heart Disease in her mother. SOCIAL HISTORY     Patient  reports that she has been smoking. She has never used smokeless tobacco. She reports current alcohol use. She reports current drug use. Drug: Marijuana Luann Thompson). PHYSICAL EXAM     ED TRIAGE VITALS  BP: 88/61, Temp: 97.8 °F (36.6 °C), Pulse: 89, Respirations: 16, SpO2: 98 %  Physical Exam  Vitals and nursing note reviewed.    Constitutional:       General: She is not in acute

## 2023-11-13 ENCOUNTER — TELEPHONE (OUTPATIENT)
Dept: FAMILY MEDICINE CLINIC | Age: 28
End: 2023-11-13

## 2023-11-13 NOTE — TELEPHONE ENCOUNTER
----- Message from Sofia Ugarte sent at 11/13/2023  8:43 AM EST -----  Subject: Referral Request    Reason for referral request? pt is wanting a referral to a dermatologist   Provider patient wants to be referred to(if known):     Provider Phone Number(if known):     Additional Information for Provider?   ---------------------------------------------------------------------------  --------------  Jack [place] Lali    [phone]; OK to leave message on voicemail  ---------------------------------------------------------------------------  --------------

## 2023-11-13 NOTE — TELEPHONE ENCOUNTER
Spoke to patient. Has had a painful lump on her back for several days and was wanting it to be checked out. Of note, patient was seen 10/4/2022 for soft tissue mass after which an US was ordered, but it was never completed. Explained to patient that I really need to see the bump to determine if antibiotics / drainage / Wilmon Marking / referral to general surgery or dermatology are is best.     Patient understood and is planning to make appointment with clinic. Gave patient phone number to call and schedule.

## 2023-11-13 NOTE — TELEPHONE ENCOUNTER
Spoke to pt and she has a painful lump on the middle of her back. Offered appointment, but she is really just wanting to see derm about this to minimize appointments d/t cost and time. Please advise.

## 2023-11-14 ENCOUNTER — OFFICE VISIT (OUTPATIENT)
Dept: FAMILY MEDICINE CLINIC | Age: 28
End: 2023-11-14

## 2023-11-14 VITALS
HEART RATE: 73 BPM | OXYGEN SATURATION: 98 % | TEMPERATURE: 98.3 F | DIASTOLIC BLOOD PRESSURE: 70 MMHG | BODY MASS INDEX: 32.93 KG/M2 | RESPIRATION RATE: 16 BRPM | HEIGHT: 70 IN | WEIGHT: 230 LBS | SYSTOLIC BLOOD PRESSURE: 110 MMHG

## 2023-11-14 DIAGNOSIS — L72.3 SEBACEOUS CYST: Primary | ICD-10-CM

## 2023-11-14 DIAGNOSIS — Z00.00 LABORATORY EXAMINATION ORDERED AS PART OF A ROUTINE GENERAL MEDICAL EXAMINATION: ICD-10-CM

## 2023-11-14 RX ORDER — CEPHALEXIN 250 MG/1
500 CAPSULE ORAL 2 TIMES DAILY
Qty: 20 CAPSULE | Refills: 0 | Status: SHIPPED | OUTPATIENT
Start: 2023-11-14 | End: 2023-11-19

## 2023-11-14 ASSESSMENT — ENCOUNTER SYMPTOMS
GASTROINTESTINAL NEGATIVE: 1
ALLERGIC/IMMUNOLOGIC NEGATIVE: 1
RESPIRATORY NEGATIVE: 1
EYES NEGATIVE: 1

## 2023-11-14 NOTE — PROGRESS NOTES
for health maintenance and risk stratification. Patient advised to follow-up in 1 month for lab results. Return in about 1 month (around 12/14/2023).       An electronic signature was used to authenticate this note  - Justin Fish MD PGY-1 on 11/16/2023 at 2:01 PM

## 2023-12-04 ENCOUNTER — OFFICE VISIT (OUTPATIENT)
Dept: FAMILY MEDICINE CLINIC | Age: 28
End: 2023-12-04
Payer: COMMERCIAL

## 2023-12-04 VITALS
SYSTOLIC BLOOD PRESSURE: 116 MMHG | RESPIRATION RATE: 16 BRPM | WEIGHT: 228 LBS | HEIGHT: 70 IN | DIASTOLIC BLOOD PRESSURE: 82 MMHG | TEMPERATURE: 98.7 F | BODY MASS INDEX: 32.64 KG/M2 | OXYGEN SATURATION: 99 % | HEART RATE: 75 BPM

## 2023-12-04 DIAGNOSIS — L72.9 SKIN CYST: ICD-10-CM

## 2023-12-04 DIAGNOSIS — R09.89 CHEST CONGESTION: ICD-10-CM

## 2023-12-04 DIAGNOSIS — H66.002 NON-RECURRENT ACUTE SUPPURATIVE OTITIS MEDIA OF LEFT EAR WITHOUT SPONTANEOUS RUPTURE OF TYMPANIC MEMBRANE: Primary | ICD-10-CM

## 2023-12-04 LAB
INFLUENZA A ANTIBODY: NEGATIVE
INFLUENZA B ANTIBODY: NEGATIVE
Lab: NORMAL
QC PASS/FAIL: NORMAL
SARS-COV-2 RDRP RESP QL NAA+PROBE: NEGATIVE
STREPTOCOCCUS A RNA: NEGATIVE

## 2023-12-04 PROCEDURE — 87635 SARS-COV-2 COVID-19 AMP PRB: CPT

## 2023-12-04 PROCEDURE — 87651 STREP A DNA AMP PROBE: CPT

## 2023-12-04 PROCEDURE — 87804 INFLUENZA ASSAY W/OPTIC: CPT

## 2023-12-04 PROCEDURE — 99213 OFFICE O/P EST LOW 20 MIN: CPT

## 2023-12-04 RX ORDER — AMOXICILLIN AND CLAVULANATE POTASSIUM 875; 125 MG/1; MG/1
1 TABLET, FILM COATED ORAL 2 TIMES DAILY
Qty: 14 TABLET | Refills: 0 | Status: SHIPPED | OUTPATIENT
Start: 2023-12-04 | End: 2023-12-11

## 2023-12-04 ASSESSMENT — ENCOUNTER SYMPTOMS
SORE THROAT: 1
DIARRHEA: 0
TROUBLE SWALLOWING: 0
ABDOMINAL PAIN: 0
COUGH: 1
VOMITING: 0
SHORTNESS OF BREATH: 0
NAUSEA: 0

## 2024-01-04 ENCOUNTER — HOSPITAL ENCOUNTER (OUTPATIENT)
Age: 29
Discharge: HOME | End: 2024-01-04
Payer: COMMERCIAL

## 2024-01-04 DIAGNOSIS — Z00.00 LABORATORY EXAMINATION ORDERED AS PART OF A ROUTINE GENERAL MEDICAL EXAMINATION: ICD-10-CM

## 2024-01-04 LAB
ANION GAP SERPL CALC-SCNC: 8 MEQ/L (ref 8–16)
BASOPHILS ABSOLUTE: 0 THOU/MM3 (ref 0–0.1)
BASOPHILS NFR BLD AUTO: 0.1 %
BUN SERPL-MCNC: 9 MG/DL (ref 7–22)
CALCIUM SERPL-MCNC: 9.1 MG/DL (ref 8.5–10.5)
CHLORIDE SERPL-SCNC: 105 MEQ/L (ref 98–111)
CHOLESTEROL, FASTING: 155 MG/DL (ref 100–199)
CO2 SERPL-SCNC: 26 MEQ/L (ref 23–33)
CREAT SERPL-MCNC: 0.6 MG/DL (ref 0.4–1.2)
DEPRECATED MEAN GLUCOSE BLD GHB EST-ACNC: 105 MG/DL (ref 70–126)
DEPRECATED RDW RBC AUTO: 45.1 FL (ref 35–45)
EOSINOPHIL NFR BLD AUTO: 0.7 %
EOSINOPHILS ABSOLUTE: 0.1 THOU/MM3 (ref 0–0.4)
ERYTHROCYTE [DISTWIDTH] IN BLOOD BY AUTOMATED COUNT: 15.3 % (ref 11.5–14.5)
GFR SERPL CREATININE-BSD FRML MDRD: > 60 ML/MIN/1.73M2
GLUCOSE SERPL-MCNC: 98 MG/DL (ref 70–108)
HBA1C MFR BLD HPLC: 5.5 % (ref 4.4–6.4)
HCT VFR BLD AUTO: 40.1 % (ref 37–47)
HDLC SERPL-MCNC: 53 MG/DL
HGB BLD-MCNC: 12.5 GM/DL (ref 12–16)
IMM GRANULOCYTES # BLD AUTO: 0.02 THOU/MM3 (ref 0–0.07)
IMM GRANULOCYTES NFR BLD AUTO: 0.3 %
LDLC SERPL CALC-MCNC: 91 MG/DL
LYMPHOCYTES ABSOLUTE: 2.3 THOU/MM3 (ref 1–4.8)
LYMPHOCYTES NFR BLD AUTO: 30.9 %
MCH RBC QN AUTO: 25.4 PG (ref 26–33)
MCHC RBC AUTO-ENTMCNC: 31.2 GM/DL (ref 32.2–35.5)
MCV RBC AUTO: 81.5 FL (ref 81–99)
MONOCYTES ABSOLUTE: 0.6 THOU/MM3 (ref 0.4–1.3)
MONOCYTES NFR BLD AUTO: 8.7 %
NEUTROPHILS NFR BLD AUTO: 59.3 %
NRBC BLD AUTO-RTO: 0 /100 WBC
PLATELET # BLD AUTO: 281 THOU/MM3 (ref 130–400)
PMV BLD AUTO: 9.9 FL (ref 9.4–12.4)
POTASSIUM SERPL-SCNC: 4.6 MEQ/L (ref 3.5–5.2)
RBC # BLD AUTO: 4.92 MILL/MM3 (ref 4.2–5.4)
SEGMENTED NEUTROPHILS ABSOLUTE COUNT: 4.3 THOU/MM3 (ref 1.8–7.7)
SODIUM SERPL-SCNC: 139 MEQ/L (ref 135–145)
TRIGLYCERIDE, FASTING: 53 MG/DL (ref 0–199)
TSH SERPL DL<=0.005 MIU/L-ACNC: 1.14 UIU/ML (ref 0.4–4.2)
WBC # BLD AUTO: 7.3 THOU/MM3 (ref 4.8–10.8)

## 2024-01-04 PROCEDURE — 36415 COLL VENOUS BLD VENIPUNCTURE: CPT

## 2024-01-04 PROCEDURE — 83036 HEMOGLOBIN GLYCOSYLATED A1C: CPT

## 2024-01-04 PROCEDURE — 80048 BASIC METABOLIC PNL TOTAL CA: CPT

## 2024-01-04 PROCEDURE — 84443 ASSAY THYROID STIM HORMONE: CPT

## 2024-01-04 PROCEDURE — 80061 LIPID PANEL: CPT

## 2024-01-04 PROCEDURE — 85025 COMPLETE CBC W/AUTO DIFF WBC: CPT

## 2024-01-15 ENCOUNTER — OFFICE VISIT (OUTPATIENT)
Dept: FAMILY MEDICINE CLINIC | Age: 29
End: 2024-01-15
Payer: COMMERCIAL

## 2024-01-15 VITALS
OXYGEN SATURATION: 99 % | RESPIRATION RATE: 20 BRPM | BODY MASS INDEX: 32.64 KG/M2 | HEART RATE: 90 BPM | TEMPERATURE: 97.9 F | SYSTOLIC BLOOD PRESSURE: 120 MMHG | DIASTOLIC BLOOD PRESSURE: 78 MMHG | HEIGHT: 70 IN | WEIGHT: 228 LBS

## 2024-01-15 DIAGNOSIS — Z23 NEED FOR DIPHTHERIA-TETANUS-PERTUSSIS (TDAP) VACCINE: ICD-10-CM

## 2024-01-15 DIAGNOSIS — F33.0 MILD EPISODE OF RECURRENT MAJOR DEPRESSIVE DISORDER (HCC): ICD-10-CM

## 2024-01-15 DIAGNOSIS — F41.9 ANXIETY: Primary | ICD-10-CM

## 2024-01-15 PROCEDURE — 90471 IMMUNIZATION ADMIN: CPT | Performed by: STUDENT IN AN ORGANIZED HEALTH CARE EDUCATION/TRAINING PROGRAM

## 2024-01-15 PROCEDURE — 90715 TDAP VACCINE 7 YRS/> IM: CPT | Performed by: STUDENT IN AN ORGANIZED HEALTH CARE EDUCATION/TRAINING PROGRAM

## 2024-01-15 PROCEDURE — 99213 OFFICE O/P EST LOW 20 MIN: CPT

## 2024-01-15 ASSESSMENT — PATIENT HEALTH QUESTIONNAIRE - PHQ9
7. TROUBLE CONCENTRATING ON THINGS, SUCH AS READING THE NEWSPAPER OR WATCHING TELEVISION: 3
2. FEELING DOWN, DEPRESSED OR HOPELESS: 3
5. POOR APPETITE OR OVEREATING: 0
9. THOUGHTS THAT YOU WOULD BE BETTER OFF DEAD, OR OF HURTING YOURSELF: 1
SUM OF ALL RESPONSES TO PHQ9 QUESTIONS 1 & 2: 6
SUM OF ALL RESPONSES TO PHQ QUESTIONS 1-9: 13
3. TROUBLE FALLING OR STAYING ASLEEP: 0
1. LITTLE INTEREST OR PLEASURE IN DOING THINGS: 3
6. FEELING BAD ABOUT YOURSELF - OR THAT YOU ARE A FAILURE OR HAVE LET YOURSELF OR YOUR FAMILY DOWN: 3
SUM OF ALL RESPONSES TO PHQ QUESTIONS 1-9: 14
SUM OF ALL RESPONSES TO PHQ QUESTIONS 1-9: 14
4. FEELING TIRED OR HAVING LITTLE ENERGY: 1
8. MOVING OR SPEAKING SO SLOWLY THAT OTHER PEOPLE COULD HAVE NOTICED. OR THE OPPOSITE, BEING SO FIGETY OR RESTLESS THAT YOU HAVE BEEN MOVING AROUND A LOT MORE THAN USUAL: 0
SUM OF ALL RESPONSES TO PHQ QUESTIONS 1-9: 14

## 2024-01-15 ASSESSMENT — ENCOUNTER SYMPTOMS
SHORTNESS OF BREATH: 0
SORE THROAT: 0
DIARRHEA: 0
NAUSEA: 0
COUGH: 0

## 2024-01-15 ASSESSMENT — COLUMBIA-SUICIDE SEVERITY RATING SCALE - C-SSRS
6. HAVE YOU EVER DONE ANYTHING, STARTED TO DO ANYTHING, OR PREPARED TO DO ANYTHING TO END YOUR LIFE?: NO
1. WITHIN THE PAST MONTH, HAVE YOU WISHED YOU WERE DEAD OR WISHED YOU COULD GO TO SLEEP AND NOT WAKE UP?: NO
2. HAVE YOU ACTUALLY HAD ANY THOUGHTS OF KILLING YOURSELF?: NO

## 2024-01-15 NOTE — PROGRESS NOTES
[facility] PROFESSIONAL SERVS  [facility] MEDICINE PRACTICE  [address]  [facility] [address]  Dept: [phone]  Dept Fax: [phone]  Loc: [phone]  Resident Note    Patient:Kaity Bryan Sex: female  YOB: 1995 Age:28 y.o.  MRN: [id number]  Assessment & Plan   1. Anxiety  - Patient to restart Celexa daily for the next 4 weeks and Buspar PRN  - Plan to follow-up in 1 month, and if not controlled will complete Adult ADHD Self-Report Scale + refer to psychiatry for ADHD evaluation  - Encouraged patient to establish with [facility] for counseling  - RTC: 1 month for med management, complete ADHD and Anxiety screening scales, possibly refer to psychiatry    2. Need for diphtheria-tetanus-pertussis (Tdap) vaccine  - Last Tetanus , updated in office today  Orders  - Tdap, BOOSTRIX, (age 10 yrs+), IM    3. Mild episode of recurrent major depressive disorder (HCC)  - PHQ9 14 today (improved from prior 20)  - Restarting Celexa and Buspar  - RTC: 1 month for med management as above    Health Maintenance/Vaccinations  - Vaccinations: COVID x1, TDaP 2009 (due for booster), Influenza 10/6/2023. Age appropriate for Tetanus - updating today  - Colon Cancer Screenin2023 Colonoscopy (for prolapsed hemorrhoids seen on rectal exam) with grade 2 internal hemorrhoids and external skin tags, recommended repeat CRH banding and repeat colonoscopy at age 45  - Lung Cancer Screening: Occasional tobacco use?  - Diabetes Screenin2023 HgbA1C 5.5  - Statin Use: 2024 Lipid Panel with T Cholesterol 155, HDL 53, LDL 91, and Triglycerides 53  - Cervical Cancer Screening: 2021 pap wnl  - Breast Cancer Screening: At age 40  - Osteoporosis Screening: At age 65  - Labwork: 2024 BMP wnl, Lipid Panel wnl, HgbA1C 5.5, TSH 1.140. 2022 Hepatic Function wn and CBC wnl.     Return in about 4 weeks (around 2024).  No future appointments.    History of 
  Brief Resident Attestation Note  Please refer to the main resident's note from today for full complete progress note.  Below is a brief note from a senior resident.    Patient:Kaity Bryan  YOB: 1995   MRN:[id number]    Subjective   28 y.o. female who presents for the following: Follow-up    Cyst on back, completed augmentin for this and ear infection (interrupted course of ABx).    Concerned for ADHD. Hx of PMDD.    Anxiety  Presents for follow-up visit. Symptoms include decreased concentration, depressed mood and nervous/anxious behavior. The severity of symptoms is causing significant distress.     Compliance with medications is 26-50%.     Review of Systems   Psychiatric/Behavioral:  Positive for decreased concentration. The patient is nervous/anxious.      PMHx: She has no past medical history on file.    Objective     Vitals:    01/15/24 0926   BP: 120/78   Site: Left Upper Arm   Position: Sitting   Cuff Size: Medium Adult   Pulse: 90   Resp: 20   Temp: 97.9 °F (36.6 °C)   TempSrc: Oral   SpO2: 99%   Weight: 103.4 kg (228 lb)   Height: 1.778 m (5' 10\")    Body mass index is 32.71 kg/m².    Physical Exam: Please see note from the main resident of this encounter    Most Recent Data:  Lab Results   Component Value Date    WBC 7.3 01/04/2024    HGB 12.5 01/04/2024    HCT 40.1 01/04/2024     01/04/2024    HDL 53 01/04/2024    ALT 13 07/06/2022    AST 11 07/06/2022     01/04/2024     01/04/2024    K 4.6 01/04/2024    CREATININE 0.6 01/04/2024    BUN 9 01/04/2024    CO2 26 01/04/2024    TSH 1.140 01/04/2024    LABA1C 5.5 01/04/2024     BP Readings from Last 3 Encounters:   01/15/24 120/78   12/04/23 116/82   11/14/23 110/70     Wt Readings from Last 3 Encounters:   01/15/24 103.4 kg (228 lb)   12/04/23 103.4 kg (228 lb)   11/14/23 104.3 kg (230 lb)     Immunization History   Administered Date(s) Administered    COVID-19, J&J, (age 18y+), IM, 0.5 mL 03/13/2021     Health 
Immunizations Administered       Name Date Dose Route    TDaP, ADACEL (age 10y-64y), BOOSTRIX (age 10y+), IM, 0.5mL 1/15/2024 0.5 mL Intramuscular    Site: Deltoid- Left    Lot: [id number]    NDC: 23983-270-08          Patient interview completed and discussed with pharmacist by Rosibel Bautista, ArthurD Candidate 2024    For Pharmacy Admin Tracking Only    Program: Medical Group  Intervention Detail: Vaccine Recommended/Administered  Intervention Accepted By: Patient/Caregiver: 1  Time Spent (min): 10,  
Statement  I have discussed the case, including pertinent history and exam findings with the resident.  I agree with the documented assessment and plan as documented by the resident.  LUKE Blair Jr., DO 1/16/2024 8:00 AM

## 2024-01-15 NOTE — PATIENT INSTRUCTIONS
Schedule with [facility] for counseling.    Take Celexa every day and Buspar PRN for the next 4 weeks and follow-up in 1 month.

## 2024-02-27 ENCOUNTER — OFFICE VISIT (OUTPATIENT)
Dept: FAMILY MEDICINE CLINIC | Age: 29
End: 2024-02-27
Payer: COMMERCIAL

## 2024-02-27 VITALS
RESPIRATION RATE: 16 BRPM | BODY MASS INDEX: 32.01 KG/M2 | DIASTOLIC BLOOD PRESSURE: 74 MMHG | OXYGEN SATURATION: 99 % | SYSTOLIC BLOOD PRESSURE: 122 MMHG | WEIGHT: 223.6 LBS | TEMPERATURE: 97.9 F | HEART RATE: 66 BPM | HEIGHT: 70 IN

## 2024-02-27 DIAGNOSIS — G57.62 PLANTAR NEUROMA OF LEFT FOOT: ICD-10-CM

## 2024-02-27 DIAGNOSIS — F32.81 PMDD (PREMENSTRUAL DYSPHORIC DISORDER): ICD-10-CM

## 2024-02-27 DIAGNOSIS — F41.9 ANXIETY: Primary | ICD-10-CM

## 2024-02-27 PROCEDURE — 99214 OFFICE O/P EST MOD 30 MIN: CPT

## 2024-02-27 RX ORDER — CITALOPRAM 40 MG/1
40 TABLET ORAL DAILY
Qty: 90 TABLET | Refills: 3 | Status: SHIPPED | OUTPATIENT
Start: 2024-02-27 | End: 2025-02-21

## 2024-02-27 SDOH — ECONOMIC STABILITY: FOOD INSECURITY: WITHIN THE PAST 12 MONTHS, YOU WORRIED THAT YOUR FOOD WOULD RUN OUT BEFORE YOU GOT MONEY TO BUY MORE.: NEVER TRUE

## 2024-02-27 SDOH — ECONOMIC STABILITY: INCOME INSECURITY: HOW HARD IS IT FOR YOU TO PAY FOR THE VERY BASICS LIKE FOOD, HOUSING, MEDICAL CARE, AND HEATING?: SOMEWHAT HARD

## 2024-02-27 SDOH — ECONOMIC STABILITY: FOOD INSECURITY: WITHIN THE PAST 12 MONTHS, THE FOOD YOU BOUGHT JUST DIDN'T LAST AND YOU DIDN'T HAVE MONEY TO GET MORE.: NEVER TRUE

## 2024-02-27 ASSESSMENT — ENCOUNTER SYMPTOMS
SHORTNESS OF BREATH: 0
BACK PAIN: 0

## 2024-02-27 NOTE — PROGRESS NOTES
SUBJECTIVE     Kaity Bryan is a 28 y.o.female    Chief Complaint   Patient presents with    1 Month Follow-Up     Anxiety and Depression    Foot Pain     Left foot pain since August with bump on bottom of foot       Chief complaint, Venetie IRA, and all pertinent details of the case reviewed with the resident.    Please see resident's note for specific details discussed at today's visit.    Pt presents for follow up.  Has forgotten to take celexa but does think it helps when she takes it.  Also, foot pain, left foot, noticed a bump.      Patient Active Problem List   Diagnosis    Macromastia    Neck pain    Upper back pain    Chronic low back pain without sciatica    Kyphosis (acquired) (postural)    Frequent headaches    Chronic pain of both shoulders    Numbness and tingling in both hands    Intertrigo       Current Outpatient Medications   Medication Sig Dispense Refill    citalopram (CELEXA) 40 MG tablet Take 1 tablet by mouth daily 90 tablet 3     No current facility-administered medications for this visit.       Review of Systems per Dr. Lemon    OBJECTIVE     /74 (Site: Left Upper Arm, Position: Sitting, Cuff Size: Medium Adult)   Pulse 66   Temp 97.9 °F (36.6 °C) (Oral)   Resp 16   Ht 1.778 m (5' 10\")   Wt 101.4 kg (223 lb 9.6 oz)   LMP 02/03/2024   SpO2 99%   BMI 32.08 kg/m²   BP Readings from Last 3 Encounters:   02/27/24 122/74   01/15/24 120/78   12/04/23 116/82       Wt Readings from Last 3 Encounters:   02/27/24 101.4 kg (223 lb 9.6 oz)   01/15/24 103.4 kg (228 lb)   12/04/23 103.4 kg (228 lb)     Body mass index is 32.08 kg/m².    Physical Exam per Dr. Lemon    Lab Results   Component Value Date    LABA1C 5.5 01/04/2024       Lab Results   Component Value Date    HDL 53 01/04/2024    LDLCALC 91 01/04/2024       The ASCVD Risk score (Dawit RASMUSSEN, et al., 2019) failed to calculate for the following reasons:    The 2019 ASCVD risk score is only valid for ages 40 to 79    Lab Results 
Medications   Medication Sig Dispense Refill    citalopram (CELEXA) 40 MG tablet Take 1 tablet by mouth daily 90 tablet 3    diclofenac sodium (VOLTAREN) 1 % GEL Apply 4 g topically 4 times daily 4 g 0     No current facility-administered medications for this visit.        Allergies  Ciprofloxacin    Physical Exam     Vitals:    02/27/24 1457   BP: 122/74   Site: Left Upper Arm   Position: Sitting   Cuff Size: Medium Adult   Pulse: 66   Resp: 16   Temp: 97.9 °F (36.6 °C)   TempSrc: Oral   SpO2: 99%   Weight: 101.4 kg (223 lb 9.6 oz)   Height: 1.778 m (5' 10\")    Body mass index is 32.08 kg/m².    Physical Exam  Cardiovascular:      Rate and Rhythm: Normal rate and regular rhythm.      Heart sounds: No murmur heard.  Pulmonary:      Breath sounds: No decreased breath sounds, wheezing, rhonchi or rales.   Chest:      Chest wall: No tenderness.   Musculoskeletal:      Right foot: Normal range of motion.      Left foot: Normal range of motion.   Feet:      Right foot:      Skin integrity: No ulcer, skin breakdown or dry skin.      Toenail Condition: Right toenails are normal.      Left foot:      Skin integrity: No ulcer, skin breakdown or dry skin.      Toenail Condition: Left toenails are normal.      Comments: Nodule 2cm in length at medial midfoot, soft and mobile. No pain on palpation. Some pain with plantarflexion when toes are extended  Psychiatric:         Attention and Perception: Attention normal.         Mood and Affect: Mood is anxious. Mood is not depressed.         Speech: Speech is rapid and pressured. Speech is not tangential.         Behavior: Behavior is not agitated, withdrawn or combative. Behavior is cooperative.         Thought Content: Thought content does not include homicidal or suicidal ideation.         Results and Diagnostics   I have reviewed: medication list, health maintenance, notes from last encounter, lab results    Most Recent Labs:  Lab Results   Component Value Date    WBC 7.3

## 2024-03-04 ENCOUNTER — TELEPHONE (OUTPATIENT)
Dept: FAMILY MEDICINE CLINIC | Age: 29
End: 2024-03-04

## 2024-03-04 NOTE — TELEPHONE ENCOUNTER
Received call from patient, stated that she needs some sort of note/documentation regarding her diagnosis of Boston's Neuroma. Patient stated that she needs listed treatment or accommodations that needs to be accepted at work. Stated she needs to be able to wear lace up shoes while at work.       Patient stated once note acquired, can be emailed to:  [email or web address]

## 2024-03-04 NOTE — TELEPHONE ENCOUNTER
Would we be able to add this to the clinic letterhead and send to her email?    To whom it may concern:    Ms. Kaity Bryan was seen in office 2/27/24 at which point she was diagnosed with Plantar Neuroma of the Left Foot. She is currently undergoing medical treatment for this condition under my care. She also needs to decrease pressure on her feet by wearing supportive shoes. Please allow Ms. Bryan to wear said shoes in the workplace - with proper arch support, no heel, and shoes with wider forefront for the toes.     Please contact the [facility] with any concerns. Thank you.    An electronic signature was used to authenticate this note  - Tigist Lemon,  PGY-1 on 3/4/2024 at 2:48 PM

## 2024-03-06 ENCOUNTER — PATIENT MESSAGE (OUTPATIENT)
Dept: FAMILY MEDICINE CLINIC | Age: 29
End: 2024-03-06

## 2024-03-07 NOTE — TELEPHONE ENCOUNTER
From: Kaity Bryan  To: Tigist Lemon DO  Sent: 3/6/2024 9:53 AM EST  Subject: Doctor's Note Request     Good Morning,   I require a doctor's note regarding my Boston's Nueroma in my left foot and the need for supportive proper footwear. I essentially need to get approval to wear lace-up shoes to work.  Thank you, Kaity Bryan

## 2024-04-05 ENCOUNTER — HOSPITAL ENCOUNTER (EMERGENCY)
Age: 29
Discharge: HOME | End: 2024-04-05

## 2024-04-05 VITALS
TEMPERATURE: 98.1 F | RESPIRATION RATE: 16 BRPM | OXYGEN SATURATION: 98 % | BODY MASS INDEX: 32.21 KG/M2 | WEIGHT: 225 LBS | DIASTOLIC BLOOD PRESSURE: 77 MMHG | SYSTOLIC BLOOD PRESSURE: 115 MMHG | HEIGHT: 70 IN | HEART RATE: 63 BPM

## 2024-04-05 DIAGNOSIS — N39.0 ACUTE UTI (URINARY TRACT INFECTION): Primary | ICD-10-CM

## 2024-04-05 LAB
BILIRUB UR STRIP.AUTO-MCNC: NEGATIVE MG/DL
CHARACTER UR: ABNORMAL
COLOR: ABNORMAL
GLUCOSE UR QL STRIP.AUTO: NEGATIVE MG/DL
KETONES UR QL STRIP.AUTO: NEGATIVE
NITRITE UR QL STRIP.AUTO: NEGATIVE
PH UR STRIP.AUTO: 5.5 [PH] (ref 5–9)
PROT UR STRIP.AUTO-MCNC: 30 MG/DL
RBC #/AREA URNS HPF: ABNORMAL /[HPF]
SP GR UR STRIP.AUTO: 1.02 (ref 1–1.03)
UROBILINOGEN, URINE: 0.2 EU/DL (ref 0.2–1)
WBC #/AREA URNS HPF: ABNORMAL /[HPF]

## 2024-04-05 PROCEDURE — 87086 URINE CULTURE/COLONY COUNT: CPT

## 2024-04-05 PROCEDURE — 99213 OFFICE O/P EST LOW 20 MIN: CPT

## 2024-04-05 PROCEDURE — 81003 URINALYSIS AUTO W/O SCOPE: CPT

## 2024-04-05 PROCEDURE — 87077 CULTURE AEROBIC IDENTIFY: CPT

## 2024-04-05 PROCEDURE — 87186 SC STD MICRODIL/AGAR DIL: CPT

## 2024-04-05 PROCEDURE — 99213 OFFICE O/P EST LOW 20 MIN: CPT | Performed by: NURSE PRACTITIONER

## 2024-04-05 RX ORDER — PHENAZOPYRIDINE HYDROCHLORIDE 100 MG/1
200 TABLET, FILM COATED ORAL 3 TIMES DAILY PRN
Qty: 6 TABLET | Refills: 0 | Status: SHIPPED | OUTPATIENT
Start: 2024-04-05 | End: 2024-04-07

## 2024-04-05 RX ORDER — NITROFURANTOIN 25; 75 MG/1; MG/1
100 CAPSULE ORAL 2 TIMES DAILY
Qty: 10 CAPSULE | Refills: 0 | Status: SHIPPED | OUTPATIENT
Start: 2024-04-05 | End: 2024-04-10

## 2024-04-05 RX ORDER — BUSPIRONE HYDROCHLORIDE 5 MG/1
5 TABLET ORAL 3 TIMES DAILY
COMMUNITY

## 2024-04-05 ASSESSMENT — PAIN DESCRIPTION - DIRECTION: RADIATING_TOWARDS: LOWER BACK

## 2024-04-05 ASSESSMENT — PAIN DESCRIPTION - ORIENTATION: ORIENTATION: LOWER

## 2024-04-05 ASSESSMENT — PAIN DESCRIPTION - LOCATION: LOCATION: ABDOMEN

## 2024-04-05 ASSESSMENT — PAIN - FUNCTIONAL ASSESSMENT
PAIN_FUNCTIONAL_ASSESSMENT: 0-10
PAIN_FUNCTIONAL_ASSESSMENT: PREVENTS OR INTERFERES SOME ACTIVE ACTIVITIES AND ADLS

## 2024-04-05 ASSESSMENT — PAIN DESCRIPTION - DESCRIPTORS: DESCRIPTORS: DISCOMFORT

## 2024-04-05 ASSESSMENT — PAIN DESCRIPTION - PAIN TYPE: TYPE: ACUTE PAIN

## 2024-04-05 ASSESSMENT — ENCOUNTER SYMPTOMS: BACK PAIN: 1

## 2024-04-05 ASSESSMENT — PAIN SCALES - GENERAL: PAINLEVEL_OUTOF10: 9

## 2024-04-05 NOTE — ED PROVIDER NOTES
[facility] URGENT CARE  UrgentCare Encounter      CHIEFCOMPLAINT       Chief Complaint   Patient presents with   • Dysuria       Nurses Notes reviewed and I agree except as noted in the HPI.  HISTORY OF PRESENT ILLNESS   Kaity Bryan is a 28 y.o. female who presents to urgent care with pains of urinary frequency, urgency, burning.  She does complain of some lower back pain.    REVIEW OF SYSTEMS     Review of Systems   Genitourinary:  Positive for dysuria, frequency and urgency.   Musculoskeletal:  Positive for back pain.       PAST MEDICAL HISTORY   History reviewed. No pertinent past medical history.    SURGICAL HISTORY     Patient  has a past surgical history that includes Tonsillectomy and adenoidectomy (2006) and [place] tooth extraction (2009).    CURRENT MEDICATIONS       Previous Medications    BUSPIRONE (BUSPAR) 5 MG TABLET    Take 1 tablet by mouth 3 times daily    CITALOPRAM (CELEXA) 40 MG TABLET    Take 1 tablet by mouth daily    DICLOFENAC SODIUM (VOLTAREN) 1 % GEL    Apply 4 g topically 4 times daily       ALLERGIES     Patient is is allergic to ciprofloxacin.    FAMILY HISTORY     Patient'sfamily history includes Heart Disease in her mother.    SOCIAL HISTORY     Patient  reports that she has been smoking. She has never used smokeless tobacco. She reports current alcohol use. She reports current drug use. Drug: Marijuana (Weed).    PHYSICAL EXAM     ED TRIAGE VITALS  BP: 115/77, Temp: 98.1 °F (36.7 °C), Pulse: 63, Respirations: 16, SpO2: 98 %  Physical Exam  Vitals and nursing note reviewed.   Constitutional:       General: She is not in acute distress.     Appearance: Normal appearance. She is not ill-appearing or toxic-appearing.   HENT:      Head: Normocephalic and atraumatic.      Nose: No congestion or rhinorrhea.      Mouth/Throat:      Mouth: Mucous membranes are moist.      Pharynx: Oropharynx is clear.   Eyes:      Extraocular Movements: Extraocular movements intact.

## 2024-04-05 NOTE — ED TRIAGE NOTES
Patient ambulated to room with complaint of urine frequency, urgency and lower abdominal pain that started last night.

## 2024-04-07 LAB
BACTERIA UR CULT: ABNORMAL
ORGANISM: ABNORMAL